# Patient Record
Sex: FEMALE | Race: WHITE | NOT HISPANIC OR LATINO | Employment: OTHER | URBAN - NONMETROPOLITAN AREA
[De-identification: names, ages, dates, MRNs, and addresses within clinical notes are randomized per-mention and may not be internally consistent; named-entity substitution may affect disease eponyms.]

---

## 2022-07-26 ENCOUNTER — OFFICE VISIT (OUTPATIENT)
Dept: FAMILY MEDICINE CLINIC | Facility: CLINIC | Age: 77
End: 2022-07-26

## 2022-07-26 VITALS
BODY MASS INDEX: 26.48 KG/M2 | DIASTOLIC BLOOD PRESSURE: 70 MMHG | WEIGHT: 158.9 LBS | OXYGEN SATURATION: 98 % | HEART RATE: 48 BPM | HEIGHT: 65 IN | SYSTOLIC BLOOD PRESSURE: 122 MMHG

## 2022-07-26 DIAGNOSIS — R73.9 HYPERGLYCEMIA: ICD-10-CM

## 2022-07-26 DIAGNOSIS — E55.9 VITAMIN D DEFICIENCY: ICD-10-CM

## 2022-07-26 DIAGNOSIS — I10 HYPERTENSION, ESSENTIAL: Primary | ICD-10-CM

## 2022-07-26 DIAGNOSIS — H40.9 GLAUCOMA OF BOTH EYES, UNSPECIFIED GLAUCOMA TYPE: ICD-10-CM

## 2022-07-26 DIAGNOSIS — E78.2 HYPERLIPIDEMIA, MIXED: ICD-10-CM

## 2022-07-26 DIAGNOSIS — E53.8 VITAMIN B12 DEFICIENCY: ICD-10-CM

## 2022-07-26 DIAGNOSIS — E03.9 HYPOTHYROIDISM (ACQUIRED): ICD-10-CM

## 2022-07-26 PROCEDURE — 99214 OFFICE O/P EST MOD 30 MIN: CPT | Performed by: FAMILY MEDICINE

## 2022-07-26 PROCEDURE — 36415 COLL VENOUS BLD VENIPUNCTURE: CPT | Performed by: FAMILY MEDICINE

## 2022-07-26 RX ORDER — MELATONIN
1000 DAILY
COMMUNITY
End: 2022-07-26 | Stop reason: SDUPTHER

## 2022-07-26 RX ORDER — FOLIC ACID 1 MG/1
1 TABLET ORAL DAILY
COMMUNITY
End: 2022-07-26 | Stop reason: SDUPTHER

## 2022-07-26 RX ORDER — ATORVASTATIN CALCIUM 10 MG/1
10 TABLET, FILM COATED ORAL DAILY
Qty: 90 TABLET | Refills: 1 | Status: SHIPPED | OUTPATIENT
Start: 2022-07-26 | End: 2023-01-26 | Stop reason: SDUPTHER

## 2022-07-26 RX ORDER — ATORVASTATIN CALCIUM 10 MG/1
10 TABLET, FILM COATED ORAL DAILY
COMMUNITY
End: 2022-07-26 | Stop reason: SDUPTHER

## 2022-07-26 RX ORDER — HYDROCHLOROTHIAZIDE 25 MG/1
25 TABLET ORAL DAILY
Qty: 90 TABLET | Refills: 1 | Status: SHIPPED | OUTPATIENT
Start: 2022-07-26 | End: 2023-01-26 | Stop reason: SDUPTHER

## 2022-07-26 RX ORDER — OLMESARTAN MEDOXOMIL 40 MG/1
40 TABLET ORAL DAILY
Qty: 90 TABLET | Refills: 1 | Status: SHIPPED | OUTPATIENT
Start: 2022-07-26 | End: 2023-01-26

## 2022-07-26 RX ORDER — OLMESARTAN MEDOXOMIL 40 MG/1
40 TABLET ORAL DAILY
COMMUNITY
End: 2022-07-26 | Stop reason: SDUPTHER

## 2022-07-26 RX ORDER — AMLODIPINE BESYLATE 5 MG/1
5 TABLET ORAL DAILY
Qty: 90 TABLET | Refills: 1 | Status: SHIPPED | OUTPATIENT
Start: 2022-07-26 | End: 2022-12-12 | Stop reason: SDUPTHER

## 2022-07-26 RX ORDER — HYDROCHLOROTHIAZIDE 25 MG/1
25 TABLET ORAL DAILY
COMMUNITY
End: 2022-07-26 | Stop reason: SDUPTHER

## 2022-07-26 RX ORDER — LANOLIN ALCOHOL/MO/W.PET/CERES
1000 CREAM (GRAM) TOPICAL EVERY OTHER DAY
Start: 2022-07-26 | End: 2023-01-26 | Stop reason: SDUPTHER

## 2022-07-26 RX ORDER — AMLODIPINE BESYLATE 2.5 MG/1
2.5 TABLET ORAL DAILY
COMMUNITY
End: 2022-07-26 | Stop reason: SDUPTHER

## 2022-07-26 RX ORDER — MELATONIN
2000 DAILY
Start: 2022-07-26 | End: 2023-01-26 | Stop reason: SDUPTHER

## 2022-07-26 RX ORDER — LATANOPROST 50 UG/ML
1 SOLUTION/ DROPS OPHTHALMIC NIGHTLY
COMMUNITY

## 2022-07-26 RX ORDER — AMLODIPINE BESYLATE 2.5 MG/1
2.5 TABLET ORAL DAILY
Qty: 90 TABLET | Refills: 1 | Status: SHIPPED | OUTPATIENT
Start: 2022-07-26 | End: 2023-01-19

## 2022-07-26 RX ORDER — LEVOTHYROXINE SODIUM 0.07 MG/1
75 TABLET ORAL DAILY
COMMUNITY
End: 2022-07-26 | Stop reason: SDUPTHER

## 2022-07-26 RX ORDER — LANOLIN ALCOHOL/MO/W.PET/CERES
1000 CREAM (GRAM) TOPICAL DAILY
COMMUNITY
End: 2022-07-26 | Stop reason: SDUPTHER

## 2022-07-26 RX ORDER — LEVOTHYROXINE SODIUM 0.07 MG/1
75 TABLET ORAL DAILY
Qty: 90 TABLET | Refills: 1 | Status: SHIPPED | OUTPATIENT
Start: 2022-07-26 | End: 2023-01-26

## 2022-07-26 RX ORDER — FOLIC ACID 1 MG/1
1 TABLET ORAL DAILY
Qty: 90 TABLET | Refills: 1 | Status: SHIPPED | OUTPATIENT
Start: 2022-07-26 | End: 2023-01-26 | Stop reason: SDUPTHER

## 2022-07-26 RX ORDER — AMLODIPINE BESYLATE 5 MG/1
5 TABLET ORAL DAILY
COMMUNITY
End: 2022-07-26 | Stop reason: SDUPTHER

## 2022-07-26 NOTE — PROGRESS NOTES
"Chief Complaint  Hypertension (Patient is fasting for labs.), Hyperlipidemia, and Hypothyroidism    Subjective    History of Present Illness:  Cecilia Crawley is a 77 y.o. female who presents today for follow-up visit and medication refills.    She continues to work hard on diet and exercise efforts given she was approaching diabetes at her last visit with Dr. Cullen.    Her last A1c with her physical in January was down to 5.8!.  Blood pressure remains well controlled on amlodipine, hydrochlorothiazide, and Benicar.    Continues with ophthalmology follow-up for glaucoma.    On B12 and folic acid along with vitamin D given past history of vitamin deficiencies.    Continues atorvastatin for hyperlipidemia.    On Synthroid for hypothyroidism with no missed doses.    Objective   Vital Signs:   /70 (BP Location: Left arm, Patient Position: Sitting, Cuff Size: Large Adult)   Pulse (!) 48   Ht 165.1 cm (65\")   Wt 72.1 kg (158 lb 14.4 oz)   SpO2 98%   BMI 26.44 kg/m²     Review of Systems   Constitutional: Negative for appetite change, chills and fever.   HENT: Negative for hearing loss.    Eyes: Negative for blurred vision.   Respiratory: Negative for chest tightness.    Cardiovascular: Negative for chest pain.   Gastrointestinal: Negative for abdominal pain.   Musculoskeletal: Negative for gait problem.   Skin: Negative for rash.   Psychiatric/Behavioral: Negative for depressed mood.       Past History:  Medical History: has no past medical history on file.   Surgical History: has no past surgical history on file.   Family History: family history is not on file.   Social History: reports that she quit smoking about 24 years ago. Her smoking use included cigarettes. She has a 20.00 pack-year smoking history. She has never used smokeless tobacco. She reports previous alcohol use. She reports that she does not use drugs.      Current Outpatient Medications:   •  amLODIPine (NORVASC) 2.5 MG tablet, Take 1 tablet " by mouth Daily., Disp: 90 tablet, Rfl: 1  •  amLODIPine (NORVASC) 5 MG tablet, Take 1 tablet by mouth Daily., Disp: 90 tablet, Rfl: 1  •  atorvastatin (LIPITOR) 10 MG tablet, Take 1 tablet by mouth Daily., Disp: 90 tablet, Rfl: 1  •  cholecalciferol (VITAMIN D3) 25 MCG (1000 UT) tablet, Take 2 tablets by mouth Daily., Disp: , Rfl:   •  folic acid (FOLVITE) 1 MG tablet, Take 1 tablet by mouth Daily., Disp: 90 tablet, Rfl: 1  •  hydroCHLOROthiazide (HYDRODIURIL) 25 MG tablet, Take 1 tablet by mouth Daily., Disp: 90 tablet, Rfl: 1  •  latanoprost (XALATAN) 0.005 % ophthalmic solution, 1 drop Every Night., Disp: , Rfl:   •  levothyroxine (SYNTHROID, LEVOTHROID) 75 MCG tablet, Take 1 tablet by mouth Daily., Disp: 90 tablet, Rfl: 1  •  olmesartan (BENICAR) 40 MG tablet, Take 1 tablet by mouth Daily., Disp: 90 tablet, Rfl: 1  •  vitamin B-12 (CYANOCOBALAMIN) 1000 MCG tablet, Take 1 tablet by mouth Every Other Day., Disp: , Rfl:     Allergies: Beta adrenergic blockers, Prazosin, and Sulfa antibiotics    Physical Exam  Constitutional:       Appearance: Normal appearance.   HENT:      Head: Normocephalic.      Right Ear: External ear normal.      Left Ear: External ear normal.      Nose: Nose normal.   Eyes:      Pupils: Pupils are equal, round, and reactive to light.   Cardiovascular:      Rate and Rhythm: Normal rate and regular rhythm.      Heart sounds: Normal heart sounds.   Pulmonary:      Effort: Pulmonary effort is normal.      Breath sounds: Normal breath sounds.   Musculoskeletal:         General: Normal range of motion.      Cervical back: Normal range of motion.   Skin:     General: Skin is warm and dry.   Neurological:      General: No focal deficit present.      Mental Status: She is alert.   Psychiatric:         Mood and Affect: Mood normal.         Behavior: Behavior normal.         Thought Content: Thought content normal.          Result Review                   Assessment and Plan  Diagnoses and all orders  for this visit:    1. Hypertension, essential (Primary)  Assessment & Plan:  Hypertension is improving with treatment.  Continue current treatment regimen.  Blood pressure will be reassessed at the next regular appointment.    Orders:  -     amLODIPine (NORVASC) 2.5 MG tablet; Take 1 tablet by mouth Daily.  Dispense: 90 tablet; Refill: 1  -     amLODIPine (NORVASC) 5 MG tablet; Take 1 tablet by mouth Daily.  Dispense: 90 tablet; Refill: 1  -     hydroCHLOROthiazide (HYDRODIURIL) 25 MG tablet; Take 1 tablet by mouth Daily.  Dispense: 90 tablet; Refill: 1  -     olmesartan (BENICAR) 40 MG tablet; Take 1 tablet by mouth Daily.  Dispense: 90 tablet; Refill: 1  -     CBC Auto Differential; Future  -     Comprehensive Metabolic Panel; Future  -     Lipid Panel; Future  -     TSH; Future  -     T4, Free; Future    2. Hyperlipidemia, mixed  Assessment & Plan:  Lipid abnormalities are improving with treatment.  Pharmacotherapy as ordered.  Lipids will be reassessed in 6 months.    Orders:  -     atorvastatin (LIPITOR) 10 MG tablet; Take 1 tablet by mouth Daily.  Dispense: 90 tablet; Refill: 1  -     CBC Auto Differential; Future  -     Comprehensive Metabolic Panel; Future  -     Lipid Panel; Future    3. Vitamin D deficiency  -     cholecalciferol (VITAMIN D3) 25 MCG (1000 UT) tablet; Take 2 tablets by mouth Daily.  -     Vitamin D 25 Hydroxy; Future    4. Glaucoma of both eyes, unspecified glaucoma type  Assessment & Plan:  Continue ophthalmology management follow-up      5. Hypothyroidism (acquired)  -     levothyroxine (SYNTHROID, LEVOTHROID) 75 MCG tablet; Take 1 tablet by mouth Daily.  Dispense: 90 tablet; Refill: 1  -     TSH; Future  -     T4, Free; Future    6. Vitamin B12 deficiency  -     folic acid (FOLVITE) 1 MG tablet; Take 1 tablet by mouth Daily.  Dispense: 90 tablet; Refill: 1  -     vitamin B-12 (CYANOCOBALAMIN) 1000 MCG tablet; Take 1 tablet by mouth Every Other Day.  -     Vitamin B12; Future  -      Folate; Future    7. Hyperglycemia  Assessment & Plan:  Continue diet and exercise efforts    Orders:  -     Hemoglobin A1c; Future      BMI is >= 25 and <30. (Overweight) The following options were offered after discussion;: exercise counseling/recommendations and nutrition counseling/recommendations          Follow Up  Return in about 6 months (around 1/26/2023) for Annual physical, Medicare Wellness, Med recheck.    Kalyan Rocha MD

## 2022-07-27 ENCOUNTER — TELEPHONE (OUTPATIENT)
Dept: FAMILY MEDICINE CLINIC | Facility: CLINIC | Age: 77
End: 2022-07-27

## 2022-07-27 LAB
25(OH)D3+25(OH)D2 SERPL-MCNC: 43.7 NG/ML (ref 30–100)
ALBUMIN SERPL-MCNC: 4.5 G/DL (ref 3.7–4.7)
ALBUMIN/GLOB SERPL: 1.8 {RATIO} (ref 1.2–2.2)
ALP SERPL-CCNC: 63 IU/L (ref 44–121)
ALT SERPL-CCNC: 13 IU/L (ref 0–32)
AST SERPL-CCNC: 23 IU/L (ref 0–40)
BASOPHILS # BLD AUTO: 0.1 X10E3/UL (ref 0–0.2)
BASOPHILS NFR BLD AUTO: 1 %
BILIRUB SERPL-MCNC: 0.3 MG/DL (ref 0–1.2)
BUN SERPL-MCNC: 33 MG/DL (ref 8–27)
BUN/CREAT SERPL: 20 (ref 12–28)
CALCIUM SERPL-MCNC: 10 MG/DL (ref 8.7–10.3)
CHLORIDE SERPL-SCNC: 106 MMOL/L (ref 96–106)
CHOLEST SERPL-MCNC: 188 MG/DL (ref 100–199)
CO2 SERPL-SCNC: 23 MMOL/L (ref 20–29)
CREAT SERPL-MCNC: 1.62 MG/DL (ref 0.57–1)
EGFRCR SERPLBLD CKD-EPI 2021: 32 ML/MIN/1.73
EOSINOPHIL # BLD AUTO: 0.2 X10E3/UL (ref 0–0.4)
EOSINOPHIL NFR BLD AUTO: 2 %
ERYTHROCYTE [DISTWIDTH] IN BLOOD BY AUTOMATED COUNT: 13.1 % (ref 11.7–15.4)
FOLATE SERPL-MCNC: >20 NG/ML
GLOBULIN SER CALC-MCNC: 2.5 G/DL (ref 1.5–4.5)
GLUCOSE SERPL-MCNC: 91 MG/DL (ref 65–99)
HBA1C MFR BLD: 5.7 % (ref 4.8–5.6)
HCT VFR BLD AUTO: 35.8 % (ref 34–46.6)
HDLC SERPL-MCNC: 54 MG/DL
HGB BLD-MCNC: 12 G/DL (ref 11.1–15.9)
IMM GRANULOCYTES # BLD AUTO: 0.1 X10E3/UL (ref 0–0.1)
IMM GRANULOCYTES NFR BLD AUTO: 1 %
LDLC SERPL CALC-MCNC: 106 MG/DL (ref 0–99)
LYMPHOCYTES # BLD AUTO: 2.1 X10E3/UL (ref 0.7–3.1)
LYMPHOCYTES NFR BLD AUTO: 28 %
MCH RBC QN AUTO: 31.7 PG (ref 26.6–33)
MCHC RBC AUTO-ENTMCNC: 33.5 G/DL (ref 31.5–35.7)
MCV RBC AUTO: 95 FL (ref 79–97)
MONOCYTES # BLD AUTO: 0.8 X10E3/UL (ref 0.1–0.9)
MONOCYTES NFR BLD AUTO: 10 %
NEUTROPHILS # BLD AUTO: 4.2 X10E3/UL (ref 1.4–7)
NEUTROPHILS NFR BLD AUTO: 58 %
PLATELET # BLD AUTO: 180 X10E3/UL (ref 150–450)
POTASSIUM SERPL-SCNC: 4.5 MMOL/L (ref 3.5–5.2)
PROT SERPL-MCNC: 7 G/DL (ref 6–8.5)
RBC # BLD AUTO: 3.78 X10E6/UL (ref 3.77–5.28)
SODIUM SERPL-SCNC: 144 MMOL/L (ref 134–144)
T4 FREE SERPL-MCNC: 1.47 NG/DL (ref 0.82–1.77)
TRIGL SERPL-MCNC: 164 MG/DL (ref 0–149)
TSH SERPL DL<=0.005 MIU/L-ACNC: 0.88 UIU/ML (ref 0.45–4.5)
VIT B12 SERPL-MCNC: 616 PG/ML (ref 232–1245)
VLDLC SERPL CALC-MCNC: 28 MG/DL (ref 5–40)
WBC # BLD AUTO: 7.4 X10E3/UL (ref 3.4–10.8)

## 2022-07-27 NOTE — TELEPHONE ENCOUNTER
Caller: Cecilia Crawley     Relationship: SELF    Best call back number: [phone]    What is your medical concern? [facility] SHE DID NOT SEE HER A1C RESULTS AND WANTED TO KNOW IF DR FLORES ORDER THIS FROM HER LAST APPOINTMENT YESTERDAY (7/26/22)    Is your provider already aware of this issue? NO

## 2022-12-12 DIAGNOSIS — I10 HYPERTENSION, ESSENTIAL: ICD-10-CM

## 2022-12-12 RX ORDER — AMLODIPINE BESYLATE 5 MG/1
5 TABLET ORAL DAILY
Qty: 90 TABLET | Refills: 1 | Status: SHIPPED | OUTPATIENT
Start: 2022-12-12 | End: 2023-01-26 | Stop reason: SDUPTHER

## 2022-12-12 NOTE — TELEPHONE ENCOUNTER
Caller: Cecilia Crawley    Relationship: Self    Best call back number: [phone] TO LEAVE A DETAILED MESSAGE IF NO ANSWER    Requested Prescriptions:   Requested Prescriptions     Pending Prescriptions Disp Refills   • amLODIPine (NORVASC) 5 MG tablet 90 tablet 1     Sig: Take 1 tablet by mouth Daily.        Pharmacy where request should be sent: [facility] [address] [place] [address] [phone] [facility] [phone]      Additional details provided by patient: PLEASE SEND 90 DAY SUPPLY    Does the patient have less than a 3 day supply:  [x] Yes  [] No    Would you like a call back once the refill request has been completed: [] Yes [x] No    If the office needs to give you a call back, can they leave a voicemail: [x] Yes [] No    Fede Nguyen Rep   12/12/22 10:12 EST

## 2023-01-17 DIAGNOSIS — I10 HYPERTENSION, ESSENTIAL: ICD-10-CM

## 2023-01-19 RX ORDER — AMLODIPINE BESYLATE 2.5 MG/1
TABLET ORAL
Qty: 90 TABLET | Refills: 0 | Status: SHIPPED | OUTPATIENT
Start: 2023-01-19 | End: 2023-01-26

## 2023-01-23 ENCOUNTER — TELEPHONE (OUTPATIENT)
Dept: FAMILY MEDICINE CLINIC | Facility: CLINIC | Age: 78
End: 2023-01-23
Payer: MEDICARE

## 2023-01-23 DIAGNOSIS — R73.9 HYPERGLYCEMIA: ICD-10-CM

## 2023-01-23 DIAGNOSIS — E03.9 HYPOTHYROIDISM (ACQUIRED): ICD-10-CM

## 2023-01-23 DIAGNOSIS — E78.2 HYPERLIPIDEMIA, MIXED: Primary | ICD-10-CM

## 2023-01-23 NOTE — TELEPHONE ENCOUNTER
Caller: Cecilia Crawley    Relationship: Self    Best call back number: [phone]    What orders are you requesting (i.e. lab or imaging): LABS    In what timeframe would the patient need to come in: BEFORE 1.26.23 APPOINTMENT     Where will you receive your lab/imaging services: OFFICE     Additional notes: WANTS TO GET LABS DONE BEFORE HER WELLNESS VISIT

## 2023-01-24 ENCOUNTER — LAB (OUTPATIENT)
Dept: FAMILY MEDICINE CLINIC | Facility: CLINIC | Age: 78
End: 2023-01-24
Payer: MEDICARE

## 2023-01-24 DIAGNOSIS — E78.2 HYPERLIPIDEMIA, MIXED: ICD-10-CM

## 2023-01-24 DIAGNOSIS — R73.9 HYPERGLYCEMIA: ICD-10-CM

## 2023-01-24 DIAGNOSIS — E03.9 HYPOTHYROIDISM (ACQUIRED): ICD-10-CM

## 2023-01-24 PROCEDURE — 36415 COLL VENOUS BLD VENIPUNCTURE: CPT | Performed by: FAMILY MEDICINE

## 2023-01-25 LAB
ALBUMIN SERPL-MCNC: 4.6 G/DL (ref 3.7–4.7)
ALBUMIN/GLOB SERPL: 2 {RATIO} (ref 1.2–2.2)
ALP SERPL-CCNC: 67 IU/L (ref 44–121)
ALT SERPL-CCNC: 13 IU/L (ref 0–32)
AST SERPL-CCNC: 21 IU/L (ref 0–40)
BILIRUB SERPL-MCNC: 0.2 MG/DL (ref 0–1.2)
BUN SERPL-MCNC: 41 MG/DL (ref 8–27)
BUN/CREAT SERPL: 29 (ref 12–28)
CALCIUM SERPL-MCNC: 9.8 MG/DL (ref 8.7–10.3)
CHLORIDE SERPL-SCNC: 105 MMOL/L (ref 96–106)
CHOLEST SERPL-MCNC: 149 MG/DL (ref 100–199)
CO2 SERPL-SCNC: 23 MMOL/L (ref 20–29)
CREAT SERPL-MCNC: 1.39 MG/DL (ref 0.57–1)
EGFRCR SERPLBLD CKD-EPI 2021: 39 ML/MIN/1.73
GLOBULIN SER CALC-MCNC: 2.3 G/DL (ref 1.5–4.5)
GLUCOSE SERPL-MCNC: 92 MG/DL (ref 70–99)
HBA1C MFR BLD: 5.8 % (ref 4.8–5.6)
HDLC SERPL-MCNC: 45 MG/DL
LDLC SERPL CALC-MCNC: 76 MG/DL (ref 0–99)
POTASSIUM SERPL-SCNC: 4.5 MMOL/L (ref 3.5–5.2)
PROT SERPL-MCNC: 6.9 G/DL (ref 6–8.5)
SODIUM SERPL-SCNC: 143 MMOL/L (ref 134–144)
T4 FREE SERPL-MCNC: 1.33 NG/DL (ref 0.82–1.77)
TRIGL SERPL-MCNC: 162 MG/DL (ref 0–149)
TSH SERPL DL<=0.005 MIU/L-ACNC: 1.97 UIU/ML (ref 0.45–4.5)
VLDLC SERPL CALC-MCNC: 28 MG/DL (ref 5–40)

## 2023-01-26 ENCOUNTER — OFFICE VISIT (OUTPATIENT)
Dept: FAMILY MEDICINE CLINIC | Facility: CLINIC | Age: 78
End: 2023-01-26
Payer: MEDICARE

## 2023-01-26 VITALS
SYSTOLIC BLOOD PRESSURE: 110 MMHG | BODY MASS INDEX: 26.66 KG/M2 | HEIGHT: 65 IN | DIASTOLIC BLOOD PRESSURE: 62 MMHG | HEART RATE: 77 BPM | OXYGEN SATURATION: 98 % | WEIGHT: 160 LBS

## 2023-01-26 DIAGNOSIS — E53.8 FOLIC ACID DEFICIENCY: ICD-10-CM

## 2023-01-26 DIAGNOSIS — Z11.59 NEED FOR HEPATITIS C SCREENING TEST: ICD-10-CM

## 2023-01-26 DIAGNOSIS — M25.511 ACUTE PAIN OF RIGHT SHOULDER: ICD-10-CM

## 2023-01-26 DIAGNOSIS — H40.9 GLAUCOMA OF BOTH EYES, UNSPECIFIED GLAUCOMA TYPE: Chronic | ICD-10-CM

## 2023-01-26 DIAGNOSIS — Z09 ENCOUNTER FOR FOLLOW-UP EXAMINATION AFTER COMPLETED TREATMENT FOR CONDITIONS OTHER THAN MALIGNANT NEOPLASM: ICD-10-CM

## 2023-01-26 DIAGNOSIS — E78.2 HYPERLIPIDEMIA, MIXED: Chronic | ICD-10-CM

## 2023-01-26 DIAGNOSIS — N18.32 STAGE 3B CHRONIC KIDNEY DISEASE: ICD-10-CM

## 2023-01-26 DIAGNOSIS — E55.9 VITAMIN D DEFICIENCY: Chronic | ICD-10-CM

## 2023-01-26 DIAGNOSIS — R73.9 HYPERGLYCEMIA: Chronic | ICD-10-CM

## 2023-01-26 DIAGNOSIS — Z23 NEED FOR PROPHYLACTIC VACCINATION AGAINST STREPTOCOCCUS PNEUMONIAE (PNEUMOCOCCUS): ICD-10-CM

## 2023-01-26 DIAGNOSIS — M75.21 BICEPS TENDINITIS OF RIGHT UPPER EXTREMITY: ICD-10-CM

## 2023-01-26 DIAGNOSIS — R80.9 MICROALBUMINURIA: ICD-10-CM

## 2023-01-26 DIAGNOSIS — E03.9 HYPOTHYROIDISM (ACQUIRED): Chronic | ICD-10-CM

## 2023-01-26 DIAGNOSIS — Z13.820 SCREENING FOR OSTEOPOROSIS: ICD-10-CM

## 2023-01-26 DIAGNOSIS — E53.8 VITAMIN B12 DEFICIENCY: Chronic | ICD-10-CM

## 2023-01-26 DIAGNOSIS — I10 HYPERTENSION, ESSENTIAL: Chronic | ICD-10-CM

## 2023-01-26 DIAGNOSIS — Z00.00 GENERAL MEDICAL EXAM: Primary | ICD-10-CM

## 2023-01-26 LAB — POC MICROALBUMIN URINE: 20

## 2023-01-26 PROCEDURE — G0009 ADMIN PNEUMOCOCCAL VACCINE: HCPCS | Performed by: FAMILY MEDICINE

## 2023-01-26 PROCEDURE — G0439 PPPS, SUBSEQ VISIT: HCPCS | Performed by: FAMILY MEDICINE

## 2023-01-26 PROCEDURE — 99397 PER PM REEVAL EST PAT 65+ YR: CPT | Performed by: FAMILY MEDICINE

## 2023-01-26 PROCEDURE — 99213 OFFICE O/P EST LOW 20 MIN: CPT | Performed by: FAMILY MEDICINE

## 2023-01-26 PROCEDURE — 1170F FXNL STATUS ASSESSED: CPT | Performed by: FAMILY MEDICINE

## 2023-01-26 PROCEDURE — 1159F MED LIST DOCD IN RCRD: CPT | Performed by: FAMILY MEDICINE

## 2023-01-26 PROCEDURE — 82044 UR ALBUMIN SEMIQUANTITATIVE: CPT | Performed by: FAMILY MEDICINE

## 2023-01-26 PROCEDURE — 90677 PCV20 VACCINE IM: CPT | Performed by: FAMILY MEDICINE

## 2023-01-26 PROCEDURE — 96160 PT-FOCUSED HLTH RISK ASSMT: CPT | Performed by: FAMILY MEDICINE

## 2023-01-26 RX ORDER — LANOLIN ALCOHOL/MO/W.PET/CERES
1000 CREAM (GRAM) TOPICAL EVERY OTHER DAY
Start: 2023-01-26

## 2023-01-26 RX ORDER — HYDROCHLOROTHIAZIDE 25 MG/1
25 TABLET ORAL DAILY
Qty: 90 TABLET | Refills: 1 | Status: SHIPPED | OUTPATIENT
Start: 2023-01-26

## 2023-01-26 RX ORDER — OLMESARTAN MEDOXOMIL 40 MG/1
TABLET ORAL
COMMUNITY
Start: 2022-12-12 | End: 2023-01-26

## 2023-01-26 RX ORDER — MELATONIN
2000 DAILY
Start: 2023-01-26

## 2023-01-26 RX ORDER — AMLODIPINE BESYLATE 5 MG/1
5 TABLET ORAL DAILY
Qty: 90 TABLET | Refills: 1 | Status: SHIPPED | OUTPATIENT
Start: 2023-01-26

## 2023-01-26 RX ORDER — FOLIC ACID 1 MG/1
1 TABLET ORAL DAILY
Qty: 90 TABLET | Refills: 1 | Status: SHIPPED | OUTPATIENT
Start: 2023-01-26

## 2023-01-26 RX ORDER — AMLODIPINE BESYLATE 2.5 MG/1
TABLET ORAL
COMMUNITY
Start: 2022-10-23 | End: 2023-01-26 | Stop reason: SDUPTHER

## 2023-01-26 RX ORDER — AMLODIPINE BESYLATE 2.5 MG/1
2.5 TABLET ORAL DAILY
Qty: 90 TABLET | Refills: 1 | Status: SHIPPED | OUTPATIENT
Start: 2023-01-26

## 2023-01-26 RX ORDER — LEVOTHYROXINE SODIUM 0.07 MG/1
75 TABLET ORAL DAILY
Qty: 90 TABLET | Refills: 1 | Status: SHIPPED | OUTPATIENT
Start: 2023-01-26

## 2023-01-26 RX ORDER — OLMESARTAN MEDOXOMIL 40 MG/1
40 TABLET ORAL DAILY
Qty: 90 TABLET | Refills: 1 | Status: SHIPPED | OUTPATIENT
Start: 2023-01-26

## 2023-01-26 RX ORDER — ATORVASTATIN CALCIUM 10 MG/1
10 TABLET, FILM COATED ORAL DAILY
Qty: 90 TABLET | Refills: 1 | Status: SHIPPED | OUTPATIENT
Start: 2023-01-26

## 2023-01-26 RX ORDER — LEVOTHYROXINE SODIUM 0.07 MG/1
TABLET ORAL
COMMUNITY
Start: 2022-11-12 | End: 2023-01-26

## 2023-01-26 NOTE — ASSESSMENT & PLAN NOTE
Reviewed together health maintenance and screening along with vaccination options at her Medicare annual wellness visit and physical exam today.    New problems addressed today included ongoing right anterior shoulder pain and biceps tendinitis.  Further work-up and treatment options reviewed together regarding her right shoulder pain and biceps tendinitis.  Given home exercises to start with recommendation for Tylenol.  If not improving she will call to get an order for physical therapy.    Encouraged her to get us a copy of her advanced directive.    Scheduling past-due DEXA.    Updated vaccinations with Prevnar 20.    Discussed hep C screening and she will get this done with her next blood work.    Discussed positive microalbuminuria but she is already on olmesartan for hypertension.  Chronic kidney disease does remain in stage III but has improved compared to her last lab work.    She does understand the need to avoid NSAIDs given chronic kidney disease.

## 2023-01-26 NOTE — ASSESSMENT & PLAN NOTE
History and exam suggestive for biceps tendinitis with possible adhesive capsulitis.    Further work-up discussed.  We will start with home exercises.  Printed and reviewed home exercises with her today.  She may use Tylenol for pain.    If not improving she will call for an order to get started with physical therapy.    This was a new issue address today.  She had plan to make an appointment for this ongoing problem even if she did not have her annual wellness visit or physical exam today.

## 2023-01-26 NOTE — PROGRESS NOTES
QUICK REFERENCE INFORMATION:  The ABCs of the Annual Wellness Visit    Subsequent Medicare Wellness Visit    @[email or web address]@    HEALTH RISK ASSESSMENT    1945    Recent Hospitalizations:  No hospitalization(s) within the last year..        Current Medical Providers:  Patient Care Team:  Kalyan Rocha MD as PCP - General (Family Medicine)        Smoking Status:  Social History     Tobacco Use   Smoking Status Former   • Packs/day: 0.50   • Years: 40.00   • Pack years: 20.00   • Types: Cigarettes   • Quit date:    • Years since quittin.0   Smokeless Tobacco Never       Alcohol Consumption:  Social History     Substance and Sexual Activity   Alcohol Use Not Currently       Depression Screen:   PHQ-2/PHQ-9 Depression Screening 2023   Little Interest or Pleasure in Doing Things 0-->not at all   Feeling Down, Depressed or Hopeless 0-->not at all   PHQ-9: Brief Depression Severity Measure Score 0       Health Habits and Functional and Cognitive Screening:  Functional & Cognitive Status 2023   Do you have difficulty preparing food and eating? No   Do you have difficulty bathing yourself, getting dressed or grooming yourself? No   Do you have difficulty using the toilet? No   Do you have difficulty moving around from place to place? No   Do you have trouble with steps or getting out of a bed or a chair? No   Current Diet Well Balanced Diet   Dental Exam Not up to date   Eye Exam Up to date   Exercise (times per week) 3 times per week   Current Exercises Include Walking   Do you need help using the phone?  No   Are you deaf or do you have serious difficulty hearing?  No   Do you need help with transportation? No   Do you need help shopping? No   Do you need help preparing meals?  No   Do you need help with housework?  No   Do you need help with laundry? No   Do you need help taking your medications? No   Do you need help managing money? No   Do you ever drive or ride in a car without wearing a seat  belt? No   Have you felt unusual stress, anger or loneliness in the last month? No   Who do you live with? Spouse   If you need help, do you have trouble finding someone available to you? No   Have you been bothered in the last four weeks by sexual problems? No   Do you have difficulty concentrating, remembering or making decisions? No       Fall Risk Screen:  JULIAN Fall Risk Assessment was completed, and patient is at LOW risk for falls.Assessment completed on:1/26/2023    ACE III MINI        Does the patient have evidence of cognitive impairment? No    Aspirin use counseling: Does not need ASA (and currently is not on it)    Recent Lab Results:  CMP:  Lab Results   Component Value Date    BUN 41 (H) 01/24/2023    CREATININE 1.39 (H) 01/24/2023    BCR 29 (H) 01/24/2023     01/24/2023    K 4.5 01/24/2023    CO2 23 01/24/2023    CALCIUM 9.8 01/24/2023    PROTENTOTREF 6.9 01/24/2023    ALBUMIN 4.6 01/24/2023    LABGLOBREF 2.3 01/24/2023    LABIL2 2.0 01/24/2023    BILITOT 0.2 01/24/2023    ALKPHOS 67 01/24/2023    AST 21 01/24/2023    ALT 13 01/24/2023     HbA1c:  Lab Results   Component Value Date    HGBA1C 5.8 (H) 01/24/2023    HGBA1C 5.7 (H) 07/26/2022     Microalbumin:  Lab Results   Component Value Date    POCMALB 20 01/26/2023     Lipid Panel  Lab Results   Component Value Date    TRIG 162 (H) 01/24/2023    HDL 45 01/24/2023    LDL 76 01/24/2023    AST 21 01/24/2023    ALT 13 01/24/2023       Visual Acuity:  No results found.    Age-appropriate Screening Schedule:  Refer to the list below for future screening recommendations based on patient's age, sex and/or medical conditions. Orders for these recommended tests are listed in the plan section. The patient has been provided with a written plan.    Health Maintenance   Topic Date Due   • DXA SCAN  Never done   • HEMOGLOBIN A1C  07/24/2023   • DIABETIC EYE EXAM  11/29/2023   • LIPID PANEL  01/24/2024   • URINE MICROALBUMIN  01/26/2024   • INFLUENZA VACCINE   Completed   • TDAP/TD VACCINES  Discontinued   • ZOSTER VACCINE  Discontinued        Subjective   History of Present Illness    Cecilia Crawley is a 77 y.o. female who presents for a Subsequent Wellness Visit and physical exam.    Doing well since our last visit together in July.    She has been experiencing some right shoulder and biceps pain after hanging [place] decorations this year.  She is using Tylenol for pain but has ongoing range of motion problems.  Interested in trying home exercises before physical therapy.    She did have blood work done before today's appointment and it showed ongoing stage IIIb chronic kidney disease related to hypertension and diabetes.  This has improved compared to her last lab work.  She does understand the need to avoid NSAIDs given chronic kidney disease.  She did have a microalbumin test today that returned positive (20).  She does continue on olmesartan for hypertension which also helps with renal protection.    She continues to work hard on diet and exercise efforts given concerns for prediabetes.     Her last A1c was down to 5.8!.  Blood pressure remains well controlled on amlodipine, hydrochlorothiazide, and Benicar.     Continues with ophthalmology follow-up for glaucoma.     On B12 and folic acid along with vitamin D given past history of vitamin deficiencies.     Continues atorvastatin for hyperlipidemia.     On Synthroid for hypothyroidism with no missed doses.    She reports she does have an advance directive and will get us a copy for chart update.    Discussed past-due DEXA and she is willing to schedule.    Mammogram up-to-date at [facility].    Willing for Prevnar 20 today.  Wants to wait on Shingrix and Tdap  CHRONIC CONDITIONS    The following portions of the patient's history were reviewed and updated as appropriate: allergies, current medications, past family history, past medical history, past social history, past surgical history and  problem list.    Outpatient Medications Prior to Visit   Medication Sig Dispense Refill   • latanoprost (XALATAN) 0.005 % ophthalmic solution 1 drop Every Night.     • amLODIPine (NORVASC) 5 MG tablet Take 1 tablet by mouth Daily. 90 tablet 1   • atorvastatin (LIPITOR) 10 MG tablet Take 1 tablet by mouth Daily. 90 tablet 1   • cholecalciferol (VITAMIN D3) 25 MCG (1000 UT) tablet Take 2 tablets by mouth Daily.     • folic acid (FOLVITE) 1 MG tablet Take 1 tablet by mouth Daily. 90 tablet 1   • hydroCHLOROthiazide (HYDRODIURIL) 25 MG tablet Take 1 tablet by mouth Daily. 90 tablet 1   • levothyroxine (SYNTHROID, LEVOTHROID) 75 MCG tablet Take 1 tablet by mouth Daily. 90 tablet 1   • levothyroxine (SYNTHROID, LEVOTHROID) 75 MCG tablet      • olmesartan (BENICAR) 40 MG tablet Take 1 tablet by mouth Daily. 90 tablet 1   • olmesartan (BENICAR) 40 MG tablet      • vitamin B-12 (CYANOCOBALAMIN) 1000 MCG tablet Take 1 tablet by mouth Every Other Day.     • amLODIPine (NORVASC) 2.5 MG tablet Take 1 tablet by mouth once daily 90 tablet 0   • amLODIPine (NORVASC) 2.5 MG tablet        No facility-administered medications prior to visit.       Patient Active Problem List   Diagnosis   • Hypertension, essential   • Hyperlipidemia, mixed   • Vitamin D deficiency   • Glaucoma of both eyes   • Hypothyroidism (acquired)   • Vitamin B12 deficiency   • Hyperglycemia   • Microalbuminuria   • General medical exam   • Screening for osteoporosis   • Need for prophylactic vaccination against Streptococcus pneumoniae (pneumococcus)   • Need for hepatitis C screening test   • Stage 3b chronic kidney disease (HCC)   • Acute pain of right shoulder   • Biceps tendinitis of right upper extremity       Advance Care Planning:  ACP discussion was held with the patient during this visit. Patient does not have an advance directive, information provided.    Identification of Risk Factors:  Risk factors include: Advance Directive Discussion  Breast  Cancer/Mammogram Screening  Fall Risk  Immunizations Discussed/Encouraged (specific immunizations; Tdap, Prevnar 20 (Pneumococcal 20-valent conjugate) and Shingrix )  Osteoporosis Risk.    Review of Systems   Constitutional: Negative for appetite change, chills, fever and unexpected weight change.   HENT: Negative for hearing loss.    Eyes: Negative for visual disturbance.   Respiratory: Negative for chest tightness, shortness of breath and wheezing.    Cardiovascular: Negative for chest pain, palpitations and leg swelling.   Gastrointestinal: Negative for abdominal pain.   Musculoskeletal: Positive for arthralgias. Negative for back pain and gait problem.        Right shoulder pain.  See HPI   Skin: Negative for rash.   Neurological: Negative for dizziness and headaches.   Psychiatric/Behavioral: Negative for agitation and confusion. The patient is not nervous/anxious.        Compared to one year ago, the patient feels her physical health is better.  Compared to one year ago, the patient feels her mental health is better.    Objective     Physical Exam  Constitutional:       Appearance: Normal appearance.   HENT:      Head: Normocephalic.      Right Ear: External ear normal.      Left Ear: External ear normal.      Nose: Nose normal.   Eyes:      Pupils: Pupils are equal, round, and reactive to light.   Cardiovascular:      Rate and Rhythm: Normal rate and regular rhythm.      Heart sounds: Normal heart sounds.   Pulmonary:      Effort: Pulmonary effort is normal.      Breath sounds: Normal breath sounds.   Musculoskeletal:      Cervical back: Normal range of motion.      Comments: Positive Neer, negative Carter.  Localized tenderness along the upper biceps consistent with biceps tendinitis.  Normal  strength   Skin:     General: Skin is warm and dry.   Neurological:      General: No focal deficit present.      Mental Status: She is alert.   Psychiatric:         Mood and Affect: Mood normal.         Behavior:  "Behavior normal.         Thought Content: Thought content normal.          Procedures     Vitals:    01/26/23 0814   BP: 110/62   BP Location: Left arm   Patient Position: Sitting   Cuff Size: Adult   Pulse: 77   SpO2: 98%   Weight: 72.6 kg (160 lb)   Height: 165.1 cm (65\")       BMI is >= 25 and <30. (Overweight) The following options were offered after discussion;: exercise counseling/recommendations and nutrition counseling/recommendations      Lab Results   Component Value Date    WBC 7.4 07/26/2022    HGB 12.0 07/26/2022    HCT 35.8 07/26/2022    MCV 95 07/26/2022     07/26/2022     Lab Results   Component Value Date    GLUCOSE 92 01/24/2023    BUN 41 (H) 01/24/2023    CREATININE 1.39 (H) 01/24/2023    BCR 29 (H) 01/24/2023    K 4.5 01/24/2023    CO2 23 01/24/2023    CALCIUM 9.8 01/24/2023    PROTENTOTREF 6.9 01/24/2023    ALBUMIN 4.6 01/24/2023    LABIL2 2.0 01/24/2023    AST 21 01/24/2023    ALT 13 01/24/2023     Lab Results   Component Value Date    TSH 1.970 01/24/2023     No results found for: PSA  Lab Results   Component Value Date    CHLPL 149 01/24/2023    TRIG 162 (H) 01/24/2023    HDL 45 01/24/2023    LDL 76 01/24/2023       Assessment & Plan   Problem List Items Addressed This Visit        Cardiac and Vasculature    Hypertension, essential (Chronic)    Current Assessment & Plan     Hypertension is improving with treatment.  Continue current treatment regimen.  Blood pressure will be reassessed at the next regular appointment.         Relevant Medications    amLODIPine (NORVASC) 2.5 MG tablet    amLODIPine (NORVASC) 5 MG tablet    hydroCHLOROthiazide (HYDRODIURIL) 25 MG tablet    olmesartan (BENICAR) 40 MG tablet    Other Relevant Orders    Comprehensive Metabolic Panel    Lipid Panel    TSH    T4, Free    Hyperlipidemia, mixed (Chronic)    Current Assessment & Plan     Lipid abnormalities are improving with treatment.  Pharmacotherapy as ordered.  Lipids will be reassessed in 6 months.         " Relevant Medications    atorvastatin (LIPITOR) 10 MG tablet    Other Relevant Orders    Comprehensive Metabolic Panel    Lipid Panel    TSH    T4, Free       Endocrine and Metabolic    Vitamin D deficiency (Chronic)    Current Assessment & Plan     Continue vitamin D         Relevant Medications    cholecalciferol (VITAMIN D3) 25 MCG (1000 UT) tablet    Other Relevant Orders    Vitamin D,25-Hydroxy    Hypothyroidism (acquired) (Chronic)    Current Assessment & Plan     Continue Synthroid         Relevant Medications    levothyroxine (SYNTHROID, LEVOTHROID) 75 MCG tablet    Other Relevant Orders    Comprehensive Metabolic Panel    Lipid Panel    TSH    T4, Free    Vitamin B12 deficiency (Chronic)    Current Assessment & Plan     Continues on B12 with folic acid         Relevant Medications    folic acid (FOLVITE) 1 MG tablet    vitamin B-12 (CYANOCOBALAMIN) 1000 MCG tablet    Other Relevant Orders    CBC Auto Differential    Vitamin B12    Hyperglycemia (Chronic)    Current Assessment & Plan     Continue to work on diet and exercise efforts.  Recent A1c was 5.8.         Relevant Orders    POCT microalbumin (Completed)    Hemoglobin A1c       Eye    Glaucoma of both eyes (Chronic)    Current Assessment & Plan     Continue ophthalmology follow-up         Relevant Medications    latanoprost (XALATAN) 0.005 % ophthalmic solution       Genitourinary and Reproductive     Microalbuminuria (Chronic)    Current Assessment & Plan     Continue on olmesartan         Stage 3b chronic kidney disease (HCC) (Chronic)    Current Assessment & Plan     Kidney function improving.    Discussed no NSAIDs over-the-counter.    Continue with management of hypertension, hyperlipidemia, and prediabetes.    Continue on losartan for hypertension and renal protection given positive microalbuminuria today.         Relevant Medications    hydroCHLOROthiazide (HYDRODIURIL) 25 MG tablet       Health Encounters    General medical exam - Primary     Current Assessment & Plan     Reviewed together health maintenance and screening along with vaccination options at her Medicare annual wellness visit and physical exam today.    New problems addressed today included ongoing right anterior shoulder pain and biceps tendinitis.  Further work-up and treatment options reviewed together regarding her right shoulder pain and biceps tendinitis.  Given home exercises to start with recommendation for Tylenol.  If not improving she will call to get an order for physical therapy.    Encouraged her to get us a copy of her advanced directive.    Scheduling past-due DEXA.    Updated vaccinations with Prevnar 20.    Discussed hep C screening and she will get this done with her next blood work.    Discussed positive microalbuminuria but she is already on olmesartan for hypertension.  Chronic kidney disease does remain in stage III but has improved compared to her last lab work.    She does understand the need to avoid NSAIDs given chronic kidney disease.                Infectious Diseases    Need for prophylactic vaccination against Streptococcus pneumoniae (pneumococcus)    Relevant Orders    Pneumococcal Conjugate Vaccine 20-Valent (PCV20) (Completed)    Need for hepatitis C screening test    Relevant Orders    HCV Antibody Rfx To Qnt PCR       Musculoskeletal and Injuries    Screening for osteoporosis    Relevant Orders    DEXA Bone Density Axial    Acute pain of right shoulder    Current Assessment & Plan     History and exam suggestive for biceps tendinitis with possible adhesive capsulitis.    Further work-up discussed.  We will start with home exercises.  Printed and reviewed home exercises with her today.  She may use Tylenol for pain.    If not improving she will call for an order to get started with physical therapy.    This was a new issue address today.  She had plan to make an appointment for this ongoing problem even if she did not have her annual wellness visit or  physical exam today.         Biceps tendinitis of right upper extremity    Current Assessment & Plan     See above        Other Visit Diagnoses     Encounter for follow-up examination after completed treatment for conditions other than malignant neoplasm        Relevant Orders    DEXA Bone Density Axial    Folic acid deficiency        Relevant Orders    CBC Auto Differential    Folate        Patient Self-Management and Personalized Health Advice  The patient has been provided with information about: exercise and weight management and preventive services including:   · Annual Wellness Visit (AWV)  · Bone Density Measurements  · Hepatitis C Virus Screening (beneficiaries must fall into one of the following categories to be eligible- high risk for HCV infection, born between [birth year]70-[birth year]23, or history of blood transfusion before 1992)  · Pneumococcal Vaccine and Administration.    Outpatient Encounter Medications as of 1/26/2023   Medication Sig Dispense Refill   • amLODIPine (NORVASC) 2.5 MG tablet Take 1 tablet by mouth Daily. 90 tablet 1   • amLODIPine (NORVASC) 5 MG tablet Take 1 tablet by mouth Daily. 90 tablet 1   • atorvastatin (LIPITOR) 10 MG tablet Take 1 tablet by mouth Daily. 90 tablet 1   • cholecalciferol (VITAMIN D3) 25 MCG (1000 UT) tablet Take 2 tablets by mouth Daily.     • folic acid (FOLVITE) 1 MG tablet Take 1 tablet by mouth Daily. 90 tablet 1   • hydroCHLOROthiazide (HYDRODIURIL) 25 MG tablet Take 1 tablet by mouth Daily. 90 tablet 1   • latanoprost (XALATAN) 0.005 % ophthalmic solution 1 drop Every Night.     • levothyroxine (SYNTHROID, LEVOTHROID) 75 MCG tablet Take 1 tablet by mouth Daily. 90 tablet 1   • olmesartan (BENICAR) 40 MG tablet Take 1 tablet by mouth Daily. 90 tablet 1   • vitamin B-12 (CYANOCOBALAMIN) 1000 MCG tablet Take 1 tablet by mouth Every Other Day.     • [DISCONTINUED] amLODIPine (NORVASC) 5 MG tablet Take 1 tablet by mouth Daily. 90 tablet 1   • [DISCONTINUED] atorvastatin  (LIPITOR) 10 MG tablet Take 1 tablet by mouth Daily. 90 tablet 1   • [DISCONTINUED] cholecalciferol (VITAMIN D3) 25 MCG (1000 UT) tablet Take 2 tablets by mouth Daily.     • [DISCONTINUED] folic acid (FOLVITE) 1 MG tablet Take 1 tablet by mouth Daily. 90 tablet 1   • [DISCONTINUED] hydroCHLOROthiazide (HYDRODIURIL) 25 MG tablet Take 1 tablet by mouth Daily. 90 tablet 1   • [DISCONTINUED] levothyroxine (SYNTHROID, LEVOTHROID) 75 MCG tablet Take 1 tablet by mouth Daily. 90 tablet 1   • [DISCONTINUED] levothyroxine (SYNTHROID, LEVOTHROID) 75 MCG tablet      • [DISCONTINUED] olmesartan (BENICAR) 40 MG tablet Take 1 tablet by mouth Daily. 90 tablet 1   • [DISCONTINUED] olmesartan (BENICAR) 40 MG tablet      • [DISCONTINUED] vitamin B-12 (CYANOCOBALAMIN) 1000 MCG tablet Take 1 tablet by mouth Every Other Day.     • [DISCONTINUED] amLODIPine (NORVASC) 2.5 MG tablet Take 1 tablet by mouth once daily 90 tablet 0   • [DISCONTINUED] amLODIPine (NORVASC) 2.5 MG tablet        No facility-administered encounter medications on file as of 1/26/2023.       Reviewed use of high risk medication in the elderly: yes  Reviewed for potential of harmful drug interactions in the elderly: yes    Diagnoses and all orders for this visit:    1. General medical exam (Primary)  Assessment & Plan:  Reviewed together health maintenance and screening along with vaccination options at her Medicare annual wellness visit and physical exam today.    New problems addressed today included ongoing right anterior shoulder pain and biceps tendinitis.  Further work-up and treatment options reviewed together regarding her right shoulder pain and biceps tendinitis.  Given home exercises to start with recommendation for Tylenol.  If not improving she will call to get an order for physical therapy.    Encouraged her to get us a copy of her advanced directive.    Scheduling past-due DEXA.    Updated vaccinations with Prevnar 20.    Discussed hep C screening and  she will get this done with her next blood work.    Discussed positive microalbuminuria but she is already on olmesartan for hypertension.  Chronic kidney disease does remain in stage III but has improved compared to her last lab work.    She does understand the need to avoid NSAIDs given chronic kidney disease.          2. Screening for osteoporosis  -     DEXA Bone Density Axial; Future    3. Need for prophylactic vaccination against Streptococcus pneumoniae (pneumococcus)  -     Pneumococcal Conjugate Vaccine 20-Valent (PCV20)    4. Hypertension, essential  Assessment & Plan:  Hypertension is improving with treatment.  Continue current treatment regimen.  Blood pressure will be reassessed at the next regular appointment.    Orders:  -     amLODIPine (NORVASC) 2.5 MG tablet; Take 1 tablet by mouth Daily.  Dispense: 90 tablet; Refill: 1  -     amLODIPine (NORVASC) 5 MG tablet; Take 1 tablet by mouth Daily.  Dispense: 90 tablet; Refill: 1  -     hydroCHLOROthiazide (HYDRODIURIL) 25 MG tablet; Take 1 tablet by mouth Daily.  Dispense: 90 tablet; Refill: 1  -     olmesartan (BENICAR) 40 MG tablet; Take 1 tablet by mouth Daily.  Dispense: 90 tablet; Refill: 1  -     Comprehensive Metabolic Panel; Future  -     Lipid Panel; Future  -     TSH; Future  -     T4, Free; Future    5. Hyperlipidemia, mixed  Assessment & Plan:  Lipid abnormalities are improving with treatment.  Pharmacotherapy as ordered.  Lipids will be reassessed in 6 months.    Orders:  -     atorvastatin (LIPITOR) 10 MG tablet; Take 1 tablet by mouth Daily.  Dispense: 90 tablet; Refill: 1  -     Comprehensive Metabolic Panel; Future  -     Lipid Panel; Future  -     TSH; Future  -     T4, Free; Future    6. Hypothyroidism (acquired)  Assessment & Plan:  Continue Synthroid    Orders:  -     levothyroxine (SYNTHROID, LEVOTHROID) 75 MCG tablet; Take 1 tablet by mouth Daily.  Dispense: 90 tablet; Refill: 1  -     Comprehensive Metabolic Panel; Future  -      Lipid Panel; Future  -     TSH; Future  -     T4, Free; Future    7. Hyperglycemia  Assessment & Plan:  Continue to work on diet and exercise efforts.  Recent A1c was 5.8.    Orders:  -     POCT microalbumin  -     Hemoglobin A1c; Future    8. Vitamin B12 deficiency  Assessment & Plan:  Continues on B12 with folic acid    Orders:  -     folic acid (FOLVITE) 1 MG tablet; Take 1 tablet by mouth Daily.  Dispense: 90 tablet; Refill: 1  -     vitamin B-12 (CYANOCOBALAMIN) 1000 MCG tablet; Take 1 tablet by mouth Every Other Day.  -     CBC Auto Differential; Future  -     Vitamin B12; Future    9. Vitamin D deficiency  Assessment & Plan:  Continue vitamin D    Orders:  -     cholecalciferol (VITAMIN D3) 25 MCG (1000 UT) tablet; Take 2 tablets by mouth Daily.  -     Vitamin D,25-Hydroxy; Future    10. Glaucoma of both eyes, unspecified glaucoma type  Assessment & Plan:  Continue ophthalmology follow-up      11. Microalbuminuria  Assessment & Plan:  Continue on olmesartan      12. Encounter for follow-up examination after completed treatment for conditions other than malignant neoplasm  -     DEXA Bone Density Axial; Future    13. Need for hepatitis C screening test  -     HCV Antibody Rfx To Qnt PCR; Future    14. Folic acid deficiency  -     CBC Auto Differential; Future  -     Folate; Future    15. Stage 3b chronic kidney disease (HCC)  Assessment & Plan:  Kidney function improving.    Discussed no NSAIDs over-the-counter.    Continue with management of hypertension, hyperlipidemia, and prediabetes.    Continue on losartan for hypertension and renal protection given positive microalbuminuria today.      16. Acute pain of right shoulder  Assessment & Plan:  History and exam suggestive for biceps tendinitis with possible adhesive capsulitis.    Further work-up discussed.  We will start with home exercises.  Printed and reviewed home exercises with her today.  She may use Tylenol for pain.    If not improving she will call  for an order to get started with physical therapy.    This was a new issue address today.  She had plan to make an appointment for this ongoing problem even if she did not have her annual wellness visit or physical exam today.      17. Biceps tendinitis of right upper extremity  Assessment & Plan:  See above        Follow Up:  Return in about 6 months (around 7/26/2023) for Med recheck, Fasting labs 1 week before apt (Drink water).     There are no Patient Instructions on file for this visit.    An After Visit Summary and PPPS with all of these plans were given to the patient.

## 2023-01-26 NOTE — ASSESSMENT & PLAN NOTE
Kidney function improving.    Discussed no NSAIDs over-the-counter.    Continue with management of hypertension, hyperlipidemia, and prediabetes.    Continue on losartan for hypertension and renal protection given positive microalbuminuria today.

## 2023-07-12 PROBLEM — R22.41: Status: ACTIVE | Noted: 2023-07-12

## 2023-07-26 ENCOUNTER — OFFICE VISIT (OUTPATIENT)
Dept: FAMILY MEDICINE CLINIC | Facility: CLINIC | Age: 78
End: 2023-07-26
Payer: MEDICARE

## 2023-07-26 VITALS
SYSTOLIC BLOOD PRESSURE: 138 MMHG | BODY MASS INDEX: 27.26 KG/M2 | OXYGEN SATURATION: 98 % | DIASTOLIC BLOOD PRESSURE: 62 MMHG | WEIGHT: 163.6 LBS | HEIGHT: 65 IN

## 2023-07-26 DIAGNOSIS — E53.8 VITAMIN B12 DEFICIENCY: Chronic | ICD-10-CM

## 2023-07-26 DIAGNOSIS — I10 HYPERTENSION, ESSENTIAL: Primary | Chronic | ICD-10-CM

## 2023-07-26 DIAGNOSIS — E55.9 VITAMIN D DEFICIENCY: Chronic | ICD-10-CM

## 2023-07-26 DIAGNOSIS — H40.9 GLAUCOMA OF BOTH EYES, UNSPECIFIED GLAUCOMA TYPE: Chronic | ICD-10-CM

## 2023-07-26 DIAGNOSIS — R80.9 MICROALBUMINURIA: Chronic | ICD-10-CM

## 2023-07-26 DIAGNOSIS — N18.32 STAGE 3B CHRONIC KIDNEY DISEASE: Chronic | ICD-10-CM

## 2023-07-26 DIAGNOSIS — M10.271 ACUTE DRUG-INDUCED GOUT INVOLVING TOE OF RIGHT FOOT: ICD-10-CM

## 2023-07-26 DIAGNOSIS — E78.2 HYPERLIPIDEMIA, MIXED: Chronic | ICD-10-CM

## 2023-07-26 DIAGNOSIS — E03.9 HYPOTHYROIDISM (ACQUIRED): Chronic | ICD-10-CM

## 2023-07-26 DIAGNOSIS — R73.9 HYPERGLYCEMIA: Chronic | ICD-10-CM

## 2023-07-26 PROBLEM — Z13.820 SCREENING FOR OSTEOPOROSIS: Status: RESOLVED | Noted: 2023-01-26 | Resolved: 2023-07-26

## 2023-07-26 PROBLEM — Z11.59 NEED FOR HEPATITIS C SCREENING TEST: Status: RESOLVED | Noted: 2023-01-26 | Resolved: 2023-07-26

## 2023-07-26 PROBLEM — M25.511 ACUTE PAIN OF RIGHT SHOULDER: Status: RESOLVED | Noted: 2023-01-26 | Resolved: 2023-07-26

## 2023-07-26 PROBLEM — R22.41: Status: RESOLVED | Noted: 2023-07-12 | Resolved: 2023-07-26

## 2023-07-26 PROBLEM — M75.21 BICEPS TENDINITIS OF RIGHT UPPER EXTREMITY: Status: RESOLVED | Noted: 2023-01-26 | Resolved: 2023-07-26

## 2023-07-26 PROBLEM — Z23 NEED FOR PROPHYLACTIC VACCINATION AGAINST STREPTOCOCCUS PNEUMONIAE (PNEUMOCOCCUS): Status: RESOLVED | Noted: 2023-01-26 | Resolved: 2023-07-26

## 2023-07-26 RX ORDER — COLCHICINE 0.6 MG/1
0.6 TABLET ORAL 2 TIMES DAILY PRN
Qty: 30 TABLET | Refills: 3 | Status: SHIPPED | OUTPATIENT
Start: 2023-07-26

## 2023-07-26 RX ORDER — AMLODIPINE BESYLATE 2.5 MG/1
2.5 TABLET ORAL DAILY
Qty: 90 TABLET | Refills: 1 | Status: SHIPPED | OUTPATIENT
Start: 2023-07-26

## 2023-07-26 RX ORDER — MELATONIN
2000 DAILY
Start: 2023-07-26

## 2023-07-26 RX ORDER — OLMESARTAN MEDOXOMIL 40 MG/1
40 TABLET ORAL DAILY
Qty: 90 TABLET | Refills: 1 | Status: SHIPPED | OUTPATIENT
Start: 2023-07-26

## 2023-07-26 RX ORDER — FOLIC ACID 1 MG/1
1 TABLET ORAL DAILY
Qty: 90 TABLET | Refills: 1 | Status: SHIPPED | OUTPATIENT
Start: 2023-07-26

## 2023-07-26 RX ORDER — LEVOTHYROXINE SODIUM 0.07 MG/1
75 TABLET ORAL DAILY
Qty: 90 TABLET | Refills: 1 | Status: SHIPPED | OUTPATIENT
Start: 2023-07-26

## 2023-07-26 RX ORDER — LANOLIN ALCOHOL/MO/W.PET/CERES
1000 CREAM (GRAM) TOPICAL EVERY OTHER DAY
Start: 2023-07-26

## 2023-07-26 RX ORDER — ATORVASTATIN CALCIUM 10 MG/1
10 TABLET, FILM COATED ORAL DAILY
Qty: 90 TABLET | Refills: 1 | Status: SHIPPED | OUTPATIENT
Start: 2023-07-26

## 2023-07-26 RX ORDER — AMLODIPINE BESYLATE 5 MG/1
5 TABLET ORAL DAILY
Qty: 90 TABLET | Refills: 1 | Status: SHIPPED | OUTPATIENT
Start: 2023-07-26

## 2023-07-26 NOTE — ASSESSMENT & PLAN NOTE
Hypertension is improving with treatment.  Medication changes per orders.  Blood pressure will be reassessed at the next regular appointment.    Stop hydrochlorothiazide given recent gout flareup.  Monitor blood pressure at home and notify us if readings are staying over 140/90 for medication adjustment

## 2023-07-26 NOTE — ASSESSMENT & PLAN NOTE
Kidney function stable compared to past labs and overall improved compared to lab work done 1 year ago.    We again discussed the need to avoid NSAIDs over-the-counter given her chronic kidney disease.    Continue with management of hypertension, hyperlipidemia, and prediabetes.    Continue on olmesartan for hypertension and renal protection given positive microalbuminuria

## 2023-07-26 NOTE — PROGRESS NOTES
Chief Complaint  Med Refill    Subjective    History of Present Illness:  Cecilia Crawley is a 78 y.o. female who presents today for 6-month follow-up visit and medication refills.    She has been doing well since our last visit together in January for Medicare annual wellness visit and physical exam together.    She did have a flareup with gout in her right great toe and uric acid level did return elevated above 9.  We did discuss gout today in detail and need to stop her hydrochlorothiazide given concerns that this is contributing to her gout risk.  She will monitor her blood pressure at home and continue with Norvasc and olmesartan and let us know if blood pressure is getting above 140/90 off hydrochlorothiazide.  She would like colchicine to use as needed if she has flareups.     She did have blood work done before today's appointment and it showed ongoing stage IIIb chronic kidney disease related to hypertension and diabetes.  This has remained stable compared to her most recent lab work but has improved compared to her blood work 1 year ago.  Her creatinine 1 year ago was 1.62 and has improved to 1.46.  GFR was 36.    She does understand the need to avoid NSAIDs given chronic kidney disease.      She did have a microalbumin test at her Medicare wellness visit in January that returned positive (20).  She does continue on olmesartan for hypertension which also helps with renal protection.     She continues to work hard on diet and exercise efforts given concerns for prediabetes.     Her last A1c was down to 5.9!.      Blood pressure remains well controlled on amlodipine, hydrochlorothiazide, and Benicar.  We are going to again stop hydrochlorothiazide given her gout flareup since her last appointment with me.     Continues with ophthalmology follow-up for glaucoma.     On B12 and folic acid along with vitamin D given past history of vitamin deficiencies.     Continues atorvastatin for hyperlipidemia.  Her most  "recent lipid panel done last week showed LDL at 64.     On Synthroid for hypothyroidism with no missed doses.  Recent thyroid studies returned normal with blood work done last week.     She reports she does have an advance directive and will get us a copy for chart update.     DEXA up-to-date for osteoporosis screening January 2023.  It returned normal.  Plan to repeat in January 2025.       Mammogram up-to-date at [facility] June 22, 2023..       Objective   Vital Signs:   /62   Ht 165.1 cm (65\")   Wt 74.2 kg (163 lb 9.6 oz)   SpO2 98%   BMI 27.22 kg/m²     Review of Systems   Constitutional:  Negative for appetite change, chills and fever.   HENT:  Negative for hearing loss.    Eyes:  Negative for blurred vision.   Respiratory:  Negative for chest tightness.    Cardiovascular:  Negative for chest pain.   Gastrointestinal:  Negative for abdominal pain.   Musculoskeletal:  Positive for arthralgias. Negative for gait problem.   Skin:  Negative for rash.   Psychiatric/Behavioral:  Negative for depressed mood.      Past History:  Medical History: has no past medical history on file.   Surgical History: has a past surgical history that includes Cataract extraction.   Family History: family history is not on file.   Social History: reports that she quit smoking about 25 years ago. Her smoking use included cigarettes. She has a 20.00 pack-year smoking history. She has never used smokeless tobacco. She reports that she does not currently use alcohol. She reports that she does not use drugs.      Current Outpatient Medications:     amLODIPine (NORVASC) 2.5 MG tablet, Take 1 tablet by mouth Daily., Disp: 90 tablet, Rfl: 1    amLODIPine (NORVASC) 5 MG tablet, Take 1 tablet by mouth Daily., Disp: 90 tablet, Rfl: 1    atorvastatin (LIPITOR) 10 MG tablet, Take 1 tablet by mouth Daily., Disp: 90 tablet, Rfl: 1    cholecalciferol (VITAMIN D3) 25 MCG (1000 UT) tablet, Take 2 tablets by mouth Daily., " Disp: , Rfl:     folic acid (FOLVITE) 1 MG tablet, Take 1 tablet by mouth Daily., Disp: 90 tablet, Rfl: 1    latanoprost (XALATAN) 0.005 % ophthalmic solution, 1 drop Every Night., Disp: , Rfl:     levothyroxine (SYNTHROID, LEVOTHROID) 75 MCG tablet, Take 1 tablet by mouth Daily., Disp: 90 tablet, Rfl: 1    olmesartan (BENICAR) 40 MG tablet, Take 1 tablet by mouth Daily., Disp: 90 tablet, Rfl: 1    vitamin B-12 (CYANOCOBALAMIN) 1000 MCG tablet, Take 1 tablet by mouth Every Other Day., Disp: , Rfl:     colchicine 0.6 MG tablet, Take 1 tablet by mouth 2 (Two) Times a Day As Needed for Muscle / Joint Pain (gout flareup)., Disp: 30 tablet, Rfl: 3    Allergies: Beta adrenergic blockers, Hydrochlorothiazide, Prazosin, and Sulfa antibiotics    Physical Exam  Constitutional:       Appearance: Normal appearance.   HENT:      Head: Normocephalic.      Right Ear: External ear normal.      Left Ear: External ear normal.      Nose: Nose normal.   Eyes:      Pupils: Pupils are equal, round, and reactive to light.   Cardiovascular:      Rate and Rhythm: Normal rate and regular rhythm.      Heart sounds: Normal heart sounds.   Pulmonary:      Effort: Pulmonary effort is normal.      Breath sounds: Normal breath sounds.   Musculoskeletal:         General: Normal range of motion.      Cervical back: Normal range of motion.   Skin:     General: Skin is warm and dry.   Neurological:      General: No focal deficit present.      Mental Status: She is alert.   Psychiatric:         Mood and Affect: Mood normal.         Behavior: Behavior normal.         Thought Content: Thought content normal.        Result Review                   Assessment and Plan  Diagnoses and all orders for this visit:    1. Hypertension, essential (Primary)  Assessment & Plan:  Hypertension is improving with treatment.  Medication changes per orders.  Blood pressure will be reassessed at the next regular appointment.    Stop hydrochlorothiazide given recent gout  flareup.  Monitor blood pressure at home and notify us if readings are staying over 140/90 for medication adjustment    Orders:  -     Comprehensive Metabolic Panel; Future  -     Lipid Panel; Future  -     TSH; Future  -     T4, Free; Future  -     amLODIPine (NORVASC) 2.5 MG tablet; Take 1 tablet by mouth Daily.  Dispense: 90 tablet; Refill: 1  -     amLODIPine (NORVASC) 5 MG tablet; Take 1 tablet by mouth Daily.  Dispense: 90 tablet; Refill: 1  -     olmesartan (BENICAR) 40 MG tablet; Take 1 tablet by mouth Daily.  Dispense: 90 tablet; Refill: 1    2. Hyperlipidemia, mixed  Assessment & Plan:  Lipid abnormalities are improving with treatment.  Pharmacotherapy as ordered.  Lipids will be reassessed in 6 months.    Orders:  -     Comprehensive Metabolic Panel; Future  -     Lipid Panel; Future  -     TSH; Future  -     T4, Free; Future  -     atorvastatin (LIPITOR) 10 MG tablet; Take 1 tablet by mouth Daily.  Dispense: 90 tablet; Refill: 1    3. Hypothyroidism (acquired)  Assessment & Plan:  Continue Synthroid    Orders:  -     Comprehensive Metabolic Panel; Future  -     Lipid Panel; Future  -     TSH; Future  -     T4, Free; Future  -     levothyroxine (SYNTHROID, LEVOTHROID) 75 MCG tablet; Take 1 tablet by mouth Daily.  Dispense: 90 tablet; Refill: 1    4. Hyperglycemia  Assessment & Plan:  Continue to work on diet and exercise efforts.  Recent A1c was 5.9.    Orders:  -     Hemoglobin A1c; Future    5. Microalbuminuria  Assessment & Plan:  Continue on olmesartan      6. Stage 3b chronic kidney disease  Assessment & Plan:  Kidney function stable compared to past labs and overall improved compared to lab work done 1 year ago.    We again discussed the need to avoid NSAIDs over-the-counter given her chronic kidney disease.    Continue with management of hypertension, hyperlipidemia, and prediabetes.    Continue on olmesartan for hypertension and renal protection given positive microalbuminuria     Orders:  -      Comprehensive Metabolic Panel; Future  -     Lipid Panel; Future  -     TSH; Future  -     T4, Free; Future    7. Vitamin B12 deficiency  Assessment & Plan:  Continues on B12 with folic acid    Orders:  -     folic acid (FOLVITE) 1 MG tablet; Take 1 tablet by mouth Daily.  Dispense: 90 tablet; Refill: 1  -     vitamin B-12 (CYANOCOBALAMIN) 1000 MCG tablet; Take 1 tablet by mouth Every Other Day.    8. Vitamin D deficiency  Assessment & Plan:  Continue vitamin D    Orders:  -     cholecalciferol (VITAMIN D3) 25 MCG (1000 UT) tablet; Take 2 tablets by mouth Daily.    9. Glaucoma of both eyes, unspecified glaucoma type  Assessment & Plan:  Continue ophthalmology follow-up      10. Acute drug-induced gout involving toe of right foot  Assessment & Plan:  Discussed concerns for hydrochlorothiazide contributing to her gout flareup.  She does have a family history of gout in her mother.  We did discuss dietary changes to help with gout prevention and she is hilaria work on diet and activity to help with gout prevention.    Plan to recheck uric acid before next follow-up visit after she has discontinued hydrochlorothiazide.    Hydrochlorothiazide allergy list so we do not use this in the future given her recent gout attack.    Questions answered.  Given colchicine to use as needed if needed for flareups.    Plan for recheck in 6 months if doing well or sooner if problems arise.    Orders:  -     Uric acid; Future  -     colchicine 0.6 MG tablet; Take 1 tablet by mouth 2 (Two) Times a Day As Needed for Muscle / Joint Pain (gout flareup).  Dispense: 30 tablet; Refill: 3                   Follow Up  Return in about 6 months (around 1/29/2024) for Med recheck, Fasting labs 1 week before apt (Drink water).    Kalyan Rocha MD

## 2023-07-26 NOTE — ASSESSMENT & PLAN NOTE
Discussed concerns for hydrochlorothiazide contributing to her gout flareup.  She does have a family history of gout in her mother.  We did discuss dietary changes to help with gout prevention and she is hilaria work on diet and activity to help with gout prevention.    Plan to recheck uric acid before next follow-up visit after she has discontinued hydrochlorothiazide.    Hydrochlorothiazide allergy list so we do not use this in the future given her recent gout attack.    Questions answered.  Given colchicine to use as needed if needed for flareups.    Plan for recheck in 6 months if doing well or sooner if problems arise.

## 2023-08-31 ENCOUNTER — TELEPHONE (OUTPATIENT)
Dept: FAMILY MEDICINE CLINIC | Facility: CLINIC | Age: 78
End: 2023-08-31

## 2023-08-31 NOTE — TELEPHONE ENCOUNTER
Caller: Cecilia Crawley     Relationship: [unfilled]     Best call back number: [phone]     What is your medical concern? FOOT STILL SWOLLEN AND HURTS TO PUT PRESSURE ON IT . GOUT IS GONE BUT THIS HAS REMAINED. PLEASE CALL TO DISCUSS     How long has this issue been going on? OVER A WEEK     Is your provider already aware of this issue? YES     Have you been treated for this issue? YES

## 2023-11-22 ENCOUNTER — TELEPHONE (OUTPATIENT)
Dept: FAMILY MEDICINE CLINIC | Facility: CLINIC | Age: 78
End: 2023-11-22

## 2023-11-22 NOTE — TELEPHONE ENCOUNTER
Caller: Cecilia Crawley    Relationship: Self    Best call back number: [phone]     What is the best time to reach you: ANYTIME, OK TO LEAVE VOICEMAIL.    Who are you requesting to speak with (clinical staff, provider,  specific staff member): CLINICAL STAFF    Do you know the name of the person who called: PATIENT    What was the call regarding: PATIENT IS CALLING TO ADVISE SINCE SHE HAS STOPPED TAKING THE HYDROCHLOROTHIAZIDE 25 MG, AND HER FEET IS SWELLING. PATIENT WOULD LIKE TO KNOW IF SHE NEEDS TO START TAKING THIS MEDICATION AGAIN. PLEASE ADVISE.    Is it okay if the provider responds through MyChart: NO CALL ONLY

## 2023-11-27 NOTE — TELEPHONE ENCOUNTER
We stopped her HCTZ given gout risk with her HCTZ medication.    It would be best to stay off the fluid pills (given gout risk) and she can try compression stockings (I would recommend the otc compression stockings from [facility] pharmacy) and she try and work more with regular exercise and compression stockings instead of restarting the fluid pill    If that is not working she can try adding back just 1/2 tab of the hctz daily for swelling but will be at higher risk for gout attacks adding that back in

## 2024-01-15 DIAGNOSIS — E03.9 HYPOTHYROIDISM (ACQUIRED): Chronic | ICD-10-CM

## 2024-01-15 RX ORDER — LEVOTHYROXINE SODIUM 0.07 MG/1
75 TABLET ORAL DAILY
Qty: 90 TABLET | Refills: 0 | Status: SHIPPED | OUTPATIENT
Start: 2024-01-15

## 2024-01-22 ENCOUNTER — LAB (OUTPATIENT)
Dept: FAMILY MEDICINE CLINIC | Facility: CLINIC | Age: 79
End: 2024-01-22
Payer: MEDICARE

## 2024-01-22 DIAGNOSIS — E03.9 HYPOTHYROIDISM (ACQUIRED): Chronic | ICD-10-CM

## 2024-01-22 DIAGNOSIS — E78.2 HYPERLIPIDEMIA, MIXED: Chronic | ICD-10-CM

## 2024-01-22 DIAGNOSIS — I10 HYPERTENSION, ESSENTIAL: Chronic | ICD-10-CM

## 2024-01-22 DIAGNOSIS — R73.9 HYPERGLYCEMIA: Chronic | ICD-10-CM

## 2024-01-22 DIAGNOSIS — M10.271 ACUTE DRUG-INDUCED GOUT INVOLVING TOE OF RIGHT FOOT: ICD-10-CM

## 2024-01-22 DIAGNOSIS — N18.32 STAGE 3B CHRONIC KIDNEY DISEASE: Chronic | ICD-10-CM

## 2024-01-22 PROCEDURE — 36415 COLL VENOUS BLD VENIPUNCTURE: CPT | Performed by: FAMILY MEDICINE

## 2024-01-23 LAB
ALBUMIN SERPL-MCNC: 4.3 G/DL (ref 3.8–4.8)
ALBUMIN/GLOB SERPL: 1.9 {RATIO} (ref 1.2–2.2)
ALP SERPL-CCNC: 73 IU/L (ref 44–121)
ALT SERPL-CCNC: 15 IU/L (ref 0–32)
AST SERPL-CCNC: 23 IU/L (ref 0–40)
BILIRUB SERPL-MCNC: 0.3 MG/DL (ref 0–1.2)
BUN SERPL-MCNC: 24 MG/DL (ref 8–27)
BUN/CREAT SERPL: 19 (ref 12–28)
CALCIUM SERPL-MCNC: 9.4 MG/DL (ref 8.7–10.3)
CHLORIDE SERPL-SCNC: 110 MMOL/L (ref 96–106)
CHOLEST SERPL-MCNC: 197 MG/DL (ref 100–199)
CO2 SERPL-SCNC: 24 MMOL/L (ref 20–29)
CREAT SERPL-MCNC: 1.25 MG/DL (ref 0.57–1)
EGFRCR SERPLBLD CKD-EPI 2021: 44 ML/MIN/1.73
GLOBULIN SER CALC-MCNC: 2.3 G/DL (ref 1.5–4.5)
GLUCOSE SERPL-MCNC: 86 MG/DL (ref 70–99)
HBA1C MFR BLD: 5.8 % (ref 4.8–5.6)
HDLC SERPL-MCNC: 55 MG/DL
LDLC SERPL CALC-MCNC: 104 MG/DL (ref 0–99)
POTASSIUM SERPL-SCNC: 4.6 MMOL/L (ref 3.5–5.2)
PROT SERPL-MCNC: 6.6 G/DL (ref 6–8.5)
SODIUM SERPL-SCNC: 148 MMOL/L (ref 134–144)
T4 FREE SERPL-MCNC: 1.14 NG/DL (ref 0.82–1.77)
TRIGL SERPL-MCNC: 220 MG/DL (ref 0–149)
TSH SERPL DL<=0.005 MIU/L-ACNC: 3.36 UIU/ML (ref 0.45–4.5)
URATE SERPL-MCNC: 7.3 MG/DL (ref 3.1–7.9)
VLDLC SERPL CALC-MCNC: 38 MG/DL (ref 5–40)

## 2024-01-28 DIAGNOSIS — E53.8 VITAMIN B12 DEFICIENCY: Chronic | ICD-10-CM

## 2024-01-29 ENCOUNTER — OFFICE VISIT (OUTPATIENT)
Dept: FAMILY MEDICINE CLINIC | Facility: CLINIC | Age: 79
End: 2024-01-29
Payer: MEDICARE

## 2024-01-29 VITALS
DIASTOLIC BLOOD PRESSURE: 70 MMHG | SYSTOLIC BLOOD PRESSURE: 144 MMHG | BODY MASS INDEX: 27.79 KG/M2 | HEART RATE: 53 BPM | OXYGEN SATURATION: 99 % | WEIGHT: 167 LBS

## 2024-01-29 DIAGNOSIS — Z00.00 GENERAL MEDICAL EXAM: Primary | ICD-10-CM

## 2024-01-29 DIAGNOSIS — E53.8 FOLIC ACID DEFICIENCY: ICD-10-CM

## 2024-01-29 DIAGNOSIS — E55.9 VITAMIN D DEFICIENCY: Chronic | ICD-10-CM

## 2024-01-29 DIAGNOSIS — E03.9 HYPOTHYROIDISM (ACQUIRED): Chronic | ICD-10-CM

## 2024-01-29 DIAGNOSIS — E66.3 OVERWEIGHT (BMI 25.0-29.9): ICD-10-CM

## 2024-01-29 DIAGNOSIS — N18.31 STAGE 3A CHRONIC KIDNEY DISEASE: Chronic | ICD-10-CM

## 2024-01-29 DIAGNOSIS — H40.9 GLAUCOMA OF BOTH EYES, UNSPECIFIED GLAUCOMA TYPE: Chronic | ICD-10-CM

## 2024-01-29 DIAGNOSIS — E53.8 VITAMIN B12 DEFICIENCY: Chronic | ICD-10-CM

## 2024-01-29 DIAGNOSIS — M10.271 ACUTE DRUG-INDUCED GOUT INVOLVING TOE OF RIGHT FOOT: ICD-10-CM

## 2024-01-29 DIAGNOSIS — R73.9 HYPERGLYCEMIA: Chronic | ICD-10-CM

## 2024-01-29 DIAGNOSIS — R80.9 MICROALBUMINURIA: Chronic | ICD-10-CM

## 2024-01-29 DIAGNOSIS — I10 HYPERTENSION, ESSENTIAL: Chronic | ICD-10-CM

## 2024-01-29 DIAGNOSIS — E78.2 HYPERLIPIDEMIA, MIXED: Chronic | ICD-10-CM

## 2024-01-29 LAB
EXPIRATION DATE: ABNORMAL
Lab: ABNORMAL
POC CREATININE URINE: 300
POC MICROALBUMIN URINE: 150

## 2024-01-29 PROCEDURE — 96160 PT-FOCUSED HLTH RISK ASSMT: CPT | Performed by: FAMILY MEDICINE

## 2024-01-29 PROCEDURE — 3078F DIAST BP <80 MM HG: CPT | Performed by: FAMILY MEDICINE

## 2024-01-29 PROCEDURE — G0439 PPPS, SUBSEQ VISIT: HCPCS | Performed by: FAMILY MEDICINE

## 2024-01-29 PROCEDURE — 3077F SYST BP >= 140 MM HG: CPT | Performed by: FAMILY MEDICINE

## 2024-01-29 PROCEDURE — 1159F MED LIST DOCD IN RCRD: CPT | Performed by: FAMILY MEDICINE

## 2024-01-29 PROCEDURE — 82044 UR ALBUMIN SEMIQUANTITATIVE: CPT | Performed by: FAMILY MEDICINE

## 2024-01-29 PROCEDURE — 1160F RVW MEDS BY RX/DR IN RCRD: CPT | Performed by: FAMILY MEDICINE

## 2024-01-29 PROCEDURE — 99397 PER PM REEVAL EST PAT 65+ YR: CPT | Performed by: FAMILY MEDICINE

## 2024-01-29 PROCEDURE — 99214 OFFICE O/P EST MOD 30 MIN: CPT | Performed by: FAMILY MEDICINE

## 2024-01-29 RX ORDER — MELATONIN
2000 DAILY
Start: 2024-01-29

## 2024-01-29 RX ORDER — FOLIC ACID 1 MG/1
1 TABLET ORAL DAILY
Qty: 90 TABLET | Refills: 2 | Status: SHIPPED | OUTPATIENT
Start: 2024-01-29

## 2024-01-29 RX ORDER — FOLIC ACID 1 MG/1
1000 TABLET ORAL DAILY
Qty: 90 TABLET | Refills: 0 | OUTPATIENT
Start: 2024-01-29

## 2024-01-29 RX ORDER — ATORVASTATIN CALCIUM 20 MG/1
20 TABLET, FILM COATED ORAL DAILY
Qty: 90 TABLET | Refills: 2 | Status: SHIPPED | OUTPATIENT
Start: 2024-01-29

## 2024-01-29 RX ORDER — AMLODIPINE AND VALSARTAN 10; 320 MG/1; MG/1
1 TABLET ORAL DAILY
Qty: 90 TABLET | Refills: 2 | Status: SHIPPED | OUTPATIENT
Start: 2024-01-29

## 2024-01-29 RX ORDER — LANOLIN ALCOHOL/MO/W.PET/CERES
1000 CREAM (GRAM) TOPICAL EVERY OTHER DAY
Start: 2024-01-29

## 2024-01-29 RX ORDER — LEVOTHYROXINE SODIUM 0.07 MG/1
75 TABLET ORAL DAILY
Qty: 90 TABLET | Refills: 2 | Status: SHIPPED | OUTPATIENT
Start: 2024-01-29

## 2024-01-29 NOTE — ASSESSMENT & PLAN NOTE
Patient's (Body mass index is 27.79 kg/m².) indicates that they are overweight with health conditions that include hypertension and dyslipidemias . Weight is improving with lifestyle modifications. BMI is is above average; BMI management plan is completed. We discussed portion control and increasing exercise.

## 2024-01-29 NOTE — ASSESSMENT & PLAN NOTE
Renal condition is improving with treatment.  Regular aerobic exercise.  Medication changes per orders.  Renal condition will be reassessed in 6 months.

## 2024-01-29 NOTE — ASSESSMENT & PLAN NOTE
Lipid abnormalities are worsening.  Pharmacotherapy as ordered.  Lipids will be reassessed in 6 months    Reviewed labs - we will go up to 20mg with atorvastatin.

## 2024-01-29 NOTE — PROGRESS NOTES
The ABCs of the Annual Wellness Visit  Subsequent Medicare Wellness Visit    Subjective    Cecilia Crawley is a 78 y.o. female who presents for a Subsequent Medicare Wellness Visit.    The following portions of the patient's history were reviewed and   updated as appropriate: allergies, current medications, past family history, past medical history, past social history, past surgical history, and problem list.    Compared to one year ago, the patient feels her physical   health is the same.    Compared to one year ago, the patient feels her mental   health is the same.    Recent Hospitalizations:  She was not admitted to the hospital during the last year.       Current Medical Providers:  Patient Care Team:  Kalyan Rocha MD as PCP - General (Family Medicine)    [facility] - Glaucoma and diabetic eye exam followup.  Last visit Jan 2024    Outpatient Medications Prior to Visit   Medication Sig Dispense Refill    colchicine 0.6 MG tablet Take 1 tablet by mouth 2 (Two) Times a Day As Needed for Muscle / Joint Pain (gout flareup). 30 tablet 3    amLODIPine (NORVASC) 2.5 MG tablet Take 1 tablet by mouth Daily. 90 tablet 1    amLODIPine (NORVASC) 5 MG tablet Take 1 tablet by mouth Daily. 90 tablet 1    atorvastatin (LIPITOR) 10 MG tablet Take 1 tablet by mouth Daily. 90 tablet 1    cholecalciferol (VITAMIN D3) 25 MCG (1000 UT) tablet Take 2 tablets by mouth Daily.      folic acid (FOLVITE) 1 MG tablet Take 1 tablet by mouth Daily. 90 tablet 1    levothyroxine (SYNTHROID, LEVOTHROID) 75 MCG tablet Take 1 tablet by mouth once daily 90 tablet 0    olmesartan (BENICAR) 40 MG tablet Take 1 tablet by mouth Daily. 90 tablet 1    vitamin B-12 (CYANOCOBALAMIN) 1000 MCG tablet Take 1 tablet by mouth Every Other Day.      latanoprost (XALATAN) 0.005 % ophthalmic solution 1 drop Every Night. (Patient not taking: Reported on 1/29/2024)       No facility-administered medications prior to visit.       No opioid  "medication identified on active medication list. I have reviewed chart for other potential  high risk medication/s and harmful drug interactions in the elderly.        Aspirin is not on active medication list.  Aspirin use is not indicated based on review of current medical condition/s. Risk of harm outweighs potential benefits.  .    Patient Active Problem List   Diagnosis    Hypertension, essential    Hyperlipidemia, mixed    Vitamin D deficiency    Glaucoma of both eyes    Hypothyroidism (acquired)    Vitamin B12 deficiency    Hyperglycemia    Microalbuminuria    General medical exam    Stage 3a chronic kidney disease    Acute drug-induced gout involving toe of right foot    Overweight (BMI 25.0-29.9)     Advance Care Planning   Advance Care Planning     Advance Directive is not on file.  ACP discussion was held with the patient during this visit. Patient does not have an advance directive, information provided.     Objective    Vitals:    24 0802 24 0824   BP: 160/82 144/70   Pulse: 53    SpO2: 99%    Weight: 75.8 kg (167 lb)      Estimated body mass index is 27.79 kg/m² as calculated from the following:    Height as of 23: 165.1 cm (65\").    Weight as of this encounter: 75.8 kg (167 lb).    BMI is >= 25 and <30. (Overweight) The following options were offered after discussion;: exercise counseling/recommendations and nutrition counseling/recommendations      Does the patient have evidence of cognitive impairment? No    Lab Results   Component Value Date    CHLPL 197 2024    TRIG 220 (H) 2024    HDL 55 2024     (H) 2024    VLDL 38 2024    HGBA1C 5.8 (H) 2024        HEALTH RISK ASSESSMENT    Smoking Status:  Social History     Tobacco Use   Smoking Status Former    Packs/day: 0.50    Years: 40.00    Additional pack years: 0.00    Total pack years: 20.00    Types: Cigarettes    Quit date:     Years since quittin.0   Smokeless Tobacco Never "     Alcohol Consumption:  Social History     Substance and Sexual Activity   Alcohol Use Not Currently     Fall Risk Screen:    KRISTENADI Fall Risk Assessment was completed, and patient is at LOW risk for falls.Assessment completed on:2024    Depression Screenin/29/2024     8:33 AM   PHQ-2/PHQ-9 Depression Screening   Little Interest or Pleasure in Doing Things 0-->not at all   Feeling Down, Depressed or Hopeless 0-->not at all   PHQ-9: Brief Depression Severity Measure Score 0       Health Habits and Functional and Cognitive Screenin/26/2023     8:17 AM   Functional & Cognitive Status   Do you have difficulty preparing food and eating? No   Do you have difficulty bathing yourself, getting dressed or grooming yourself? No   Do you have difficulty using the toilet? No   Do you have difficulty moving around from place to place? No   Do you have trouble with steps or getting out of a bed or a chair? No   Current Diet Well Balanced Diet   Dental Exam Not up to date   Eye Exam Up to date   Exercise (times per week) 3 times per week   Current Exercises Include Walking   Do you need help using the phone?  No   Are you deaf or do you have serious difficulty hearing?  No   Do you need help to go to places out of walking distance? No   Do you need help shopping? No   Do you need help preparing meals?  No   Do you need help with housework?  No   Do you need help with laundry? No   Do you need help taking your medications? No   Do you need help managing money? No   Do you ever drive or ride in a car without wearing a seat belt? No   Have you felt unusual stress, anger or loneliness in the last month? No   Who do you live with? Spouse   If you need help, do you have trouble finding someone available to you? No   Have you been bothered in the last four weeks by sexual problems? No   Do you have difficulty concentrating, remembering or making decisions? No       Age-appropriate Screening Schedule:  Refer to the  list below for future screening recommendations based on patient's age, sex and/or medical conditions. Orders for these recommended tests are listed in the plan section. The patient has been provided with a written plan.    Health Maintenance   Topic Date Due    HEMOGLOBIN A1C  07/22/2024    DIABETIC EYE EXAM  01/15/2025    LIPID PANEL  01/22/2025    ANNUAL WELLNESS VISIT  01/29/2025    URINE MICROALBUMIN  01/29/2025    BMI FOLLOWUP  01/29/2025    DXA SCAN  01/31/2025    COVID-19 Vaccine  Completed    INFLUENZA VACCINE  Completed    Pneumococcal Vaccine 65+  Completed    TDAP/TD VACCINES  Discontinued    ZOSTER VACCINE  Discontinued                  CMS Preventative Services Quick Reference  Risk Factors Identified During Encounter  Fall Risk-High or Moderate: Discussed Fall Prevention in the home  The above risks/problems have been discussed with the patient.  Pertinent information has been shared with the patient in the After Visit Summary.  An After Visit Summary and PPPS were made available to the patient.    Follow Up:   Next Medicare Wellness visit to be scheduled in 1 year.       Additional E&M Note during same encounter follows:  Patient has multiple medical problems which are significant and separately identifiable that require additional work above and beyond the Medicare Wellness Visit.      Chief Complaint  medication recheck    Subjective        HPI  Cecilia Crawley is also being seen today for 6-month follow-up visit and medication refills.    She has been doing well since our last visit together in July.    She did have a flareup with gout in her right great toe and uric acid level did return elevated above 9 in the past.  We did stop her HCTZ at her last visit.  She has continued with Norvasc and olmesartan and had good BP checks off hctz at home.  She does have colchicine to use for flareups as needed.  Repeat uric acid returned significantly improved off HCTZ (down from 9.7 to 7.3). No gout flareups  off HCTZ!      She did have blood work done before today's appointment and it showed ongoing stage IIIa chronic kidney disease related to hypertension and diabetes (improved from last labs when she was in stage IIIb).      She does understand the need to avoid NSAIDs given chronic kidney disease.      She did have a microalbumin test at her Medicare wellness visit in January that returned positive (20) and it is pos again today.  She does continue on her ARB for hypertension which also helps with renal protection.     She continues to work hard on diet and exercise efforts given concerns for prediabetes.     Her last A1c was down to 5.8!.      Blood pressure up today and she does report home BP readings higher since last visit - willing to change from norvasc and benicar to exforge.      Continues with ophthalmology follow-up for glaucoma.  Underwent glaucoma sgy in 2023 and is off drops!  Good diabetic eye checkup Jan 2024.      On B12 and folic acid along with vitamin D given past history of vitamin deficiencies.     Continues atorvastatin for hyperlipidemia.  Her most recent lipid panel done last week showed LDL at 104 but was 64 with labs in July.  Willing for dose increase to 20mg with atorvastatin.      On Synthroid for hypothyroidism with no missed doses.  Recent thyroid studies returned normal with blood work done last week.     She reports she does have an advance directive and will get us a copy for chart update.     DEXA up-to-date for osteoporosis screening January 2023.  It returned normal.  Plan to repeat in January 2025.       Mammogram up-to-date at [facility] 22, 2023.  She plans for yearly mammogram at [facility] in June.         Review of Systems   Constitutional:  Negative for activity change, appetite change, chills, fatigue and fever.   HENT:  Negative for ear pain, hearing loss and trouble swallowing.    Eyes:  Negative for pain and visual disturbance.   Respiratory:   Negative for cough, chest tightness, shortness of breath and wheezing.    Cardiovascular:  Negative for chest pain, palpitations and leg swelling.   Gastrointestinal:  Negative for abdominal pain and blood in stool.   Genitourinary:  Negative for difficulty urinating.   Musculoskeletal:  Negative for arthralgias, back pain and joint swelling.   Skin:  Negative for rash.   Neurological:  Negative for dizziness, weakness and light-headedness.   Psychiatric/Behavioral:  Negative for agitation, behavioral problems, dysphoric mood and sleep disturbance.        Objective   Vital Signs:  /70   Pulse 53   Wt 75.8 kg (167 lb)   SpO2 99%   BMI 27.79 kg/m²     Physical Exam  Constitutional:       Appearance: Normal appearance.   HENT:      Head: Normocephalic.      Right Ear: External ear normal.      Left Ear: External ear normal.      Nose: Nose normal.   Eyes:      Pupils: Pupils are equal, round, and reactive to light.   Cardiovascular:      Rate and Rhythm: Normal rate and regular rhythm.      Heart sounds: Normal heart sounds.   Pulmonary:      Effort: Pulmonary effort is normal.      Breath sounds: Normal breath sounds.   Musculoskeletal:         General: Normal range of motion.      Cervical back: Normal range of motion.   Skin:     General: Skin is warm and dry.   Neurological:      General: No focal deficit present.      Mental Status: She is alert.   Psychiatric:         Mood and Affect: Mood normal.         Behavior: Behavior normal.         Thought Content: Thought content normal.                         Assessment and Plan   Diagnoses and all orders for this visit:    1. General medical exam (Primary)  Assessment & Plan:  Discussed together health maintenance and screening along with vaccination options and healthy diet and exercise habits as part of the preventative counseling at their physical exam today.       2. Hypertension, essential  Assessment & Plan:  Hypertension is worsening.  Weight  loss.  Regular aerobic exercise.  Medication changes per orders.  Blood pressure will be reassessed at the next regular appointment.    Will change from norvasc/benicar to exforge and adjust up norvasc to 10mg in exforge from 7.5mg  Cont home bp checks and notify us if staying over 140/90 .    Orders:  -     amLODIPine-valsartan (Exforge)  MG per tablet; Take 1 tablet by mouth Daily. (Replaces amlodipine and olmesartan)  Dispense: 90 tablet; Refill: 2  -     CBC Auto Differential; Future  -     Comprehensive Metabolic Panel; Future  -     Lipid Panel; Future  -     TSH; Future  -     T4, Free; Future    3. Hyperlipidemia, mixed  Assessment & Plan:  Lipid abnormalities are worsening.  Pharmacotherapy as ordered.  Lipids will be reassessed in 6 months    Reviewed labs - we will go up to 20mg with atorvastatin.    Orders:  -     atorvastatin (LIPITOR) 20 MG tablet; Take 1 tablet by mouth Daily.  Dispense: 90 tablet; Refill: 2  -     CBC Auto Differential; Future  -     Comprehensive Metabolic Panel; Future  -     Lipid Panel; Future  -     TSH; Future  -     T4, Free; Future    4. Hypothyroidism (acquired)  Assessment & Plan:  Continue Synthroid    Orders:  -     levothyroxine (SYNTHROID, LEVOTHROID) 75 MCG tablet; Take 1 tablet by mouth Daily.  Dispense: 90 tablet; Refill: 2  -     CBC Auto Differential; Future  -     Comprehensive Metabolic Panel; Future  -     Lipid Panel; Future  -     TSH; Future  -     T4, Free; Future    5. Stage 3a chronic kidney disease  Assessment & Plan:  Renal condition is improving with treatment.  Regular aerobic exercise.  Medication changes per orders.  Renal condition will be reassessed in 6 months.    Orders:  -     CBC Auto Differential; Future  -     Comprehensive Metabolic Panel; Future  -     Lipid Panel; Future  -     TSH; Future  -     T4, Free; Future    6. Microalbuminuria  Assessment & Plan:  Continue on ARB - changed to exforge today for better BP control      Orders:  -     POCT microalbumin    7. Hyperglycemia  Assessment & Plan:  Continue to work on diet and exercise efforts.  Recent A1c was 5.8.    Orders:  -     POCT microalbumin  -     Hemoglobin A1c; Future    8. Glaucoma of both eyes, unspecified glaucoma type  Assessment & Plan:  Doing well after glaucoma sgy in 2023    No longer on drops      9. Vitamin B12 deficiency  Assessment & Plan:  Continues on B12 with folic acid    Orders:  -     vitamin B-12 (CYANOCOBALAMIN) 1000 MCG tablet; Take 1 tablet by mouth Every Other Day.  -     folic acid (FOLVITE) 1 MG tablet; Take 1 tablet by mouth Daily.  Dispense: 90 tablet; Refill: 2  -     CBC Auto Differential; Future  -     Vitamin B12; Future    10. Vitamin D deficiency  Assessment & Plan:  Continue vitamin D    Orders:  -     cholecalciferol (VITAMIN D3) 25 MCG (1000 UT) tablet; Take 2 tablets by mouth Daily.  -     Vitamin D,25-Hydroxy; Future    11. Acute drug-induced gout involving toe of right foot  Assessment & Plan:  Resolved off hctz      12. Overweight (BMI 25.0-29.9)  Assessment & Plan:  Patient's (Body mass index is 27.79 kg/m².) indicates that they are overweight with health conditions that include hypertension and dyslipidemias . Weight is improving with lifestyle modifications. BMI is is above average; BMI management plan is completed. We discussed portion control and increasing exercise.       13. Folic acid deficiency  -     CBC Auto Differential; Future  -     Folate; Future             Follow Up   Return in about 6 months (around 7/29/2024) for Fasting for labs at appointment (but drink water!).  Patient was given instructions and counseling regarding her condition or for health maintenance advice. Please see specific information pulled into the AVS if appropriate.

## 2024-01-29 NOTE — ASSESSMENT & PLAN NOTE
Hypertension is worsening.  Weight loss.  Regular aerobic exercise.  Medication changes per orders.  Blood pressure will be reassessed at the next regular appointment.    Will change from norvasc/benicar to exforge and adjust up norvasc to 10mg in exforge from 7.5mg  Cont home bp checks and notify us if staying over 140/90 .

## 2024-04-11 DIAGNOSIS — E78.2 HYPERLIPIDEMIA, MIXED: Chronic | ICD-10-CM

## 2024-04-12 RX ORDER — ATORVASTATIN CALCIUM 20 MG/1
20 TABLET, FILM COATED ORAL DAILY
Qty: 90 TABLET | Refills: 2 | Status: SHIPPED | OUTPATIENT
Start: 2024-04-12

## 2024-04-12 RX ORDER — ATORVASTATIN CALCIUM 10 MG/1
10 TABLET, FILM COATED ORAL DAILY
Qty: 90 TABLET | Refills: 0 | OUTPATIENT
Start: 2024-04-12

## 2024-04-12 NOTE — TELEPHONE ENCOUNTER
Her atorvastatin was changed to 20mg and sent in for 6 mos of refills at her visit in January.  That is why the 10mg dose was not resent. But I did refill the 20mg dose again in case she had problems getting that filled from Jan - she should be on the 20mg dose.

## 2024-04-17 DIAGNOSIS — I10 HYPERTENSION, ESSENTIAL: Chronic | ICD-10-CM

## 2024-04-17 RX ORDER — AMLODIPINE BESYLATE 2.5 MG/1
2.5 TABLET ORAL DAILY
Qty: 90 TABLET | Refills: 0 | OUTPATIENT
Start: 2024-04-17

## 2024-06-26 DIAGNOSIS — R92.8 ABNORMALITY OF RIGHT BREAST ON SCREENING MAMMOGRAM: Primary | ICD-10-CM

## 2024-06-27 ENCOUNTER — TELEPHONE (OUTPATIENT)
Dept: FAMILY MEDICINE CLINIC | Facility: CLINIC | Age: 79
End: 2024-06-27
Payer: MEDICARE

## 2024-06-27 NOTE — TELEPHONE ENCOUNTER
HUB TO RELAY    her recent screening mammogram returned with some findings that require additional imaging work-up of the right breast.     This does frequently happen given the increased sensitivity of the new digital and 3D mammogram technology.     We do need to get the follow-up imaging done just to make sure everything looks okay.     I put an order in for the diagnostic mammogram and breast ultrasound to also be done if needed.     She will get a call with this appointment information and we will contact her as soon as the additional imaging results are available.     It is very important for her to get this done.  Please let me know if she has any questions.

## 2024-06-27 NOTE — TELEPHONE ENCOUNTER
Name: Crawley, Cecilia CASTILLO      Relationship: Self      Best Callback Number: [phone]       HUB PROVIDED THE RELAY MESSAGE FROM THE OFFICE      PATIENT: VOICED UNDERSTANDING AND HAS NO FURTHER QUESTIONS AT THIS TIME    ADDITIONAL INFORMATION:

## 2024-06-27 NOTE — TELEPHONE ENCOUNTER
HUB CAN RELAY:     PATIENT IS SCHEDULED AT [facility] ON AUGUST 22, 2024 AT 1:00 PM FOR A DIAGNOSTIC MAMMOGRAM AND ULTRASOUND. IF YOU NEED TO CANCEL OR RESCHEDULE YOU CAN CONTACT [facility]ENTRAL SCHEDULING [phone]. YOU CAN ALSO CALL THIS NUMBER TO CHECK FOR ANY CANCELLATIONS. THANK YOU

## 2024-07-24 ENCOUNTER — LAB (OUTPATIENT)
Dept: FAMILY MEDICINE CLINIC | Facility: CLINIC | Age: 79
End: 2024-07-24
Payer: MEDICARE

## 2024-07-24 DIAGNOSIS — R73.9 HYPERGLYCEMIA: Chronic | ICD-10-CM

## 2024-07-24 DIAGNOSIS — E53.8 VITAMIN B12 DEFICIENCY: Chronic | ICD-10-CM

## 2024-07-24 DIAGNOSIS — E55.9 VITAMIN D DEFICIENCY: Chronic | ICD-10-CM

## 2024-07-24 DIAGNOSIS — E78.2 HYPERLIPIDEMIA, MIXED: Chronic | ICD-10-CM

## 2024-07-24 DIAGNOSIS — E03.9 HYPOTHYROIDISM (ACQUIRED): Chronic | ICD-10-CM

## 2024-07-24 DIAGNOSIS — N18.31 STAGE 3A CHRONIC KIDNEY DISEASE: Chronic | ICD-10-CM

## 2024-07-24 DIAGNOSIS — E53.8 FOLIC ACID DEFICIENCY: ICD-10-CM

## 2024-07-24 DIAGNOSIS — I10 HYPERTENSION, ESSENTIAL: Chronic | ICD-10-CM

## 2024-07-24 PROCEDURE — 36415 COLL VENOUS BLD VENIPUNCTURE: CPT | Performed by: FAMILY MEDICINE

## 2024-07-25 LAB
25(OH)D3+25(OH)D2 SERPL-MCNC: 33.8 NG/ML (ref 30–100)
ALBUMIN SERPL-MCNC: 4.1 G/DL (ref 3.7–4.7)
ALP SERPL-CCNC: 72 IU/L (ref 44–121)
ALT SERPL-CCNC: 15 IU/L (ref 0–32)
AST SERPL-CCNC: 30 IU/L (ref 0–40)
BASOPHILS # BLD AUTO: 0.1 X10E3/UL (ref 0–0.2)
BASOPHILS NFR BLD AUTO: 1 %
BILIRUB SERPL-MCNC: 0.3 MG/DL (ref 0–1.2)
BUN SERPL-MCNC: 29 MG/DL (ref 8–27)
BUN/CREAT SERPL: 20 (ref 12–28)
CALCIUM SERPL-MCNC: 9.7 MG/DL (ref 8.7–10.3)
CHLORIDE SERPL-SCNC: 108 MMOL/L (ref 96–106)
CHOLEST SERPL-MCNC: 159 MG/DL (ref 100–199)
CO2 SERPL-SCNC: 22 MMOL/L (ref 20–29)
CREAT SERPL-MCNC: 1.43 MG/DL (ref 0.57–1)
EGFRCR SERPLBLD CKD-EPI 2021: 37 ML/MIN/1.73
EOSINOPHIL # BLD AUTO: 0.2 X10E3/UL (ref 0–0.4)
EOSINOPHIL NFR BLD AUTO: 2 %
ERYTHROCYTE [DISTWIDTH] IN BLOOD BY AUTOMATED COUNT: 13.8 % (ref 11.7–15.4)
FOLATE SERPL-MCNC: >20 NG/ML
GLOBULIN SER CALC-MCNC: 2.5 G/DL (ref 1.5–4.5)
GLUCOSE SERPL-MCNC: 93 MG/DL (ref 70–99)
HBA1C MFR BLD: 5.8 % (ref 4.8–5.6)
HCT VFR BLD AUTO: 38 % (ref 34–46.6)
HDLC SERPL-MCNC: 59 MG/DL
HGB BLD-MCNC: 12.3 G/DL (ref 11.1–15.9)
IMM GRANULOCYTES # BLD AUTO: 0 X10E3/UL (ref 0–0.1)
IMM GRANULOCYTES NFR BLD AUTO: 0 %
LDLC SERPL CALC-MCNC: 77 MG/DL (ref 0–99)
LYMPHOCYTES # BLD AUTO: 2.4 X10E3/UL (ref 0.7–3.1)
LYMPHOCYTES NFR BLD AUTO: 32 %
MCH RBC QN AUTO: 30.8 PG (ref 26.6–33)
MCHC RBC AUTO-ENTMCNC: 32.4 G/DL (ref 31.5–35.7)
MCV RBC AUTO: 95 FL (ref 79–97)
MONOCYTES # BLD AUTO: 0.8 X10E3/UL (ref 0.1–0.9)
MONOCYTES NFR BLD AUTO: 11 %
NEUTROPHILS # BLD AUTO: 4 X10E3/UL (ref 1.4–7)
NEUTROPHILS NFR BLD AUTO: 54 %
PLATELET # BLD AUTO: 188 X10E3/UL (ref 150–450)
POTASSIUM SERPL-SCNC: 4.6 MMOL/L (ref 3.5–5.2)
PROT SERPL-MCNC: 6.6 G/DL (ref 6–8.5)
RBC # BLD AUTO: 4 X10E6/UL (ref 3.77–5.28)
SODIUM SERPL-SCNC: 144 MMOL/L (ref 134–144)
T4 FREE SERPL-MCNC: 1.26 NG/DL (ref 0.82–1.77)
TRIGL SERPL-MCNC: 131 MG/DL (ref 0–149)
TSH SERPL DL<=0.005 MIU/L-ACNC: 2.25 UIU/ML (ref 0.45–4.5)
VIT B12 SERPL-MCNC: 619 PG/ML (ref 232–1245)
VLDLC SERPL CALC-MCNC: 23 MG/DL (ref 5–40)
WBC # BLD AUTO: 7.4 X10E3/UL (ref 3.4–10.8)

## 2024-07-31 ENCOUNTER — OFFICE VISIT (OUTPATIENT)
Dept: FAMILY MEDICINE CLINIC | Facility: CLINIC | Age: 79
End: 2024-07-31
Payer: MEDICARE

## 2024-07-31 VITALS
OXYGEN SATURATION: 96 % | HEART RATE: 52 BPM | HEIGHT: 65 IN | BODY MASS INDEX: 27.82 KG/M2 | WEIGHT: 167 LBS | SYSTOLIC BLOOD PRESSURE: 158 MMHG | DIASTOLIC BLOOD PRESSURE: 62 MMHG

## 2024-07-31 DIAGNOSIS — E66.3 OVERWEIGHT (BMI 25.0-29.9): ICD-10-CM

## 2024-07-31 DIAGNOSIS — E55.9 VITAMIN D DEFICIENCY: Chronic | ICD-10-CM

## 2024-07-31 DIAGNOSIS — R73.9 HYPERGLYCEMIA: Chronic | ICD-10-CM

## 2024-07-31 DIAGNOSIS — E53.8 VITAMIN B12 DEFICIENCY: Chronic | ICD-10-CM

## 2024-07-31 DIAGNOSIS — N18.32 STAGE 3B CHRONIC KIDNEY DISEASE: ICD-10-CM

## 2024-07-31 DIAGNOSIS — E03.9 HYPOTHYROIDISM (ACQUIRED): Chronic | ICD-10-CM

## 2024-07-31 DIAGNOSIS — I10 HYPERTENSION, ESSENTIAL: Primary | Chronic | ICD-10-CM

## 2024-07-31 DIAGNOSIS — E78.2 HYPERLIPIDEMIA, MIXED: Chronic | ICD-10-CM

## 2024-07-31 DIAGNOSIS — R80.9 MICROALBUMINURIA: Chronic | ICD-10-CM

## 2024-07-31 PROCEDURE — G2211 COMPLEX E/M VISIT ADD ON: HCPCS | Performed by: FAMILY MEDICINE

## 2024-07-31 PROCEDURE — 3078F DIAST BP <80 MM HG: CPT | Performed by: FAMILY MEDICINE

## 2024-07-31 PROCEDURE — 99214 OFFICE O/P EST MOD 30 MIN: CPT | Performed by: FAMILY MEDICINE

## 2024-07-31 PROCEDURE — 1126F AMNT PAIN NOTED NONE PRSNT: CPT | Performed by: FAMILY MEDICINE

## 2024-07-31 PROCEDURE — 3077F SYST BP >= 140 MM HG: CPT | Performed by: FAMILY MEDICINE

## 2024-07-31 PROCEDURE — 1160F RVW MEDS BY RX/DR IN RCRD: CPT | Performed by: FAMILY MEDICINE

## 2024-07-31 PROCEDURE — 1159F MED LIST DOCD IN RCRD: CPT | Performed by: FAMILY MEDICINE

## 2024-07-31 RX ORDER — FOLIC ACID 1 MG/1
1 TABLET ORAL DAILY
Qty: 90 TABLET | Refills: 2 | Status: SHIPPED | OUTPATIENT
Start: 2024-07-31

## 2024-07-31 RX ORDER — LEVOTHYROXINE SODIUM 0.07 MG/1
75 TABLET ORAL DAILY
Qty: 90 TABLET | Refills: 2 | Status: SHIPPED | OUTPATIENT
Start: 2024-07-31

## 2024-07-31 RX ORDER — LANOLIN ALCOHOL/MO/W.PET/CERES
1000 CREAM (GRAM) TOPICAL EVERY OTHER DAY
Start: 2024-07-31

## 2024-07-31 RX ORDER — AMLODIPINE AND VALSARTAN 10; 320 MG/1; MG/1
1 TABLET ORAL DAILY
Qty: 90 TABLET | Refills: 2 | Status: SHIPPED | OUTPATIENT
Start: 2024-07-31

## 2024-07-31 RX ORDER — ATORVASTATIN CALCIUM 20 MG/1
20 TABLET, FILM COATED ORAL DAILY
Qty: 90 TABLET | Refills: 2 | Status: SHIPPED | OUTPATIENT
Start: 2024-07-31

## 2024-07-31 RX ORDER — HYDRALAZINE HYDROCHLORIDE 25 MG/1
25 TABLET, FILM COATED ORAL 3 TIMES DAILY
Qty: 90 TABLET | Refills: 1 | Status: SHIPPED | OUTPATIENT
Start: 2024-07-31

## 2024-07-31 RX ORDER — CHOLECALCIFEROL (VITAMIN D3) 25 MCG
2000 TABLET ORAL DAILY
Start: 2024-07-31

## 2024-07-31 NOTE — ASSESSMENT & PLAN NOTE
Discussed BP elevation here and home readings    We will add hydralazine given her baseline bradycardia and CKD.  Cont exforge.  Recheck in 4-6 weeks or sooner if problems arise

## 2024-08-22 DIAGNOSIS — R92.8 ABNORMALITY OF RIGHT BREAST ON SCREENING MAMMOGRAM: Primary | ICD-10-CM

## 2024-08-26 DIAGNOSIS — R92.8 ABNORMALITY OF RIGHT BREAST ON SCREENING MAMMOGRAM: Primary | ICD-10-CM

## 2024-08-30 ENCOUNTER — OFFICE VISIT (OUTPATIENT)
Dept: FAMILY MEDICINE CLINIC | Facility: CLINIC | Age: 79
End: 2024-08-30
Payer: MEDICARE

## 2024-08-30 VITALS
SYSTOLIC BLOOD PRESSURE: 152 MMHG | HEART RATE: 51 BPM | WEIGHT: 167.2 LBS | BODY MASS INDEX: 27.86 KG/M2 | DIASTOLIC BLOOD PRESSURE: 68 MMHG | HEIGHT: 65 IN | OXYGEN SATURATION: 98 %

## 2024-08-30 DIAGNOSIS — E03.9 HYPOTHYROIDISM (ACQUIRED): Chronic | ICD-10-CM

## 2024-08-30 DIAGNOSIS — R92.8 ABNORMALITY OF RIGHT BREAST ON SCREENING MAMMOGRAM: ICD-10-CM

## 2024-08-30 DIAGNOSIS — R80.9 MICROALBUMINURIA: Chronic | ICD-10-CM

## 2024-08-30 DIAGNOSIS — R73.9 HYPERGLYCEMIA: Chronic | ICD-10-CM

## 2024-08-30 DIAGNOSIS — E78.2 HYPERLIPIDEMIA, MIXED: Chronic | ICD-10-CM

## 2024-08-30 DIAGNOSIS — E66.3 OVERWEIGHT (BMI 25.0-29.9): ICD-10-CM

## 2024-08-30 DIAGNOSIS — I10 HYPERTENSION, ESSENTIAL: Primary | Chronic | ICD-10-CM

## 2024-08-30 DIAGNOSIS — N18.32 STAGE 3B CHRONIC KIDNEY DISEASE: ICD-10-CM

## 2024-08-30 PROCEDURE — G2211 COMPLEX E/M VISIT ADD ON: HCPCS | Performed by: FAMILY MEDICINE

## 2024-08-30 PROCEDURE — 1126F AMNT PAIN NOTED NONE PRSNT: CPT | Performed by: FAMILY MEDICINE

## 2024-08-30 PROCEDURE — 99214 OFFICE O/P EST MOD 30 MIN: CPT | Performed by: FAMILY MEDICINE

## 2024-08-30 PROCEDURE — 1159F MED LIST DOCD IN RCRD: CPT | Performed by: FAMILY MEDICINE

## 2024-08-30 PROCEDURE — 3077F SYST BP >= 140 MM HG: CPT | Performed by: FAMILY MEDICINE

## 2024-08-30 PROCEDURE — 3078F DIAST BP <80 MM HG: CPT | Performed by: FAMILY MEDICINE

## 2024-08-30 PROCEDURE — 1160F RVW MEDS BY RX/DR IN RCRD: CPT | Performed by: FAMILY MEDICINE

## 2024-08-30 RX ORDER — HYDRALAZINE HYDROCHLORIDE 50 MG/1
50 TABLET, FILM COATED ORAL 3 TIMES DAILY
Qty: 90 TABLET | Refills: 3 | Status: SHIPPED | OUTPATIENT
Start: 2024-08-30

## 2024-08-30 NOTE — ASSESSMENT & PLAN NOTE
Patient's (There is no height or weight on file to calculate BMI.) indicates that they are overweight with health conditions that include hypertension and dyslipidemias . Weight is improving with lifestyle modifications. BMI is is above average; BMI management plan is completed. We discussed portion control and increasing exercise.

## 2024-08-30 NOTE — PROGRESS NOTES
Chief Complaint  Hypertension followup after adding hydralazine.  Recheck on Hyperlipidemia, Hypothyroidism, Hyperglycemia    Subjective    History of Present Illness:  Cecilia Crawley is a 79 y.o. female who presents today for follow-up visit and medication refills.    Doing well since our visit together in July.  We did add hydralazine given uncontrolled BP on exforge and past problems with bradycardia with b-blockers and CKD-IIIb.    BP is still up but no side-effects with low-dose hydralazine.  Willing for dose adjustment to 50mg TID - discussed options for cardiology referral and she will consider in the future if still unable to get BP better controlled with hydralazine adjustment.     She did have a flareup with gout in her right great toe and uric acid level did return elevated above 9 in the past.  We did stop her HCTZ.  She does have colchicine to use for flareups as needed.  Repeat uric acid returned significantly improved off HCTZ (down from 9.7 to 7.3). No gout flareups off HCTZ!      Last labs in July 2024 returned with ongoing stage IIIb chronic kidney disease related to hypertension and diabetes.      She does understand the need to avoid NSAIDs given chronic kidney disease.      She did have a microalbumin test at her Medicare wellness visit in January 2024 that returned positive.  She does continue on her ARB for hypertension which also helps with renal protection.     She continues to work hard on diet and exercise efforts given concerns for prediabetes.     Her last A1c was stable at 5.8!.       Continues with ophthalmology follow-up for glaucoma.  Underwent glaucoma sgy in 2023 and is off drops!  Good diabetic eye checkup Jan 2024.      On B12 and folic acid along with vitamin D given past history of vitamin deficiencies.     Continues atorvastatin for hyperlipidemia.  Her most recent lipid panel done showed LDL at 77.     On Synthroid for hypothyroidism with no missed doses.  Recent thyroid  "studies returned normal with blood work done July 2024     She reports she does have an advance directive and will get us a copy for chart update.     DEXA up-to-date for osteoporosis screening January 2023.  It returned normal.  Plan to repeat in January 2025.       Mammogram up-to-date at [facility] June 24, 2024 with need for followup imaging of the R breast - this was completed on 8/22/24 with no findings concerning for breast cancer and plan to recheck a R breast diagnostic mammogram and ultrasound in 6 mos (Feb 2025) - this has been ordered and scheduled.     Objective   Vital Signs:   /68   Pulse 51   Ht 165.1 cm (65\")   Wt 75.8 kg (167 lb 3.2 oz)   SpO2 98%   BMI 27.82 kg/m²     Review of Systems   Constitutional:  Negative for appetite change, chills and fever.   HENT:  Negative for hearing loss.    Eyes:  Negative for blurred vision.   Respiratory:  Negative for chest tightness.    Cardiovascular:  Negative for chest pain.   Gastrointestinal:  Negative for abdominal pain.   Musculoskeletal:  Negative for gait problem.   Skin:  Negative for rash.   Psychiatric/Behavioral:  Negative for depressed mood.        Past History:  Medical History: has a past medical history of Hypertension.   Surgical History: has a past surgical history that includes Cataract extraction and Eye surgery.   Family History: family history is not on file.   Social History: reports that she quit smoking about 26 years ago. Her smoking use included cigarettes. She started smoking about 66 years ago. She has a 20 pack-year smoking history. She has never used smokeless tobacco. She reports that she does not currently use alcohol. She reports that she does not use drugs.      Current Outpatient Medications:     amLODIPine-valsartan (Exforge)  MG per tablet, Take 1 tablet by mouth Daily., Disp: 90 tablet, Rfl: 2    atorvastatin (LIPITOR) 20 MG tablet, Take 1 tablet by mouth Daily., Disp: 90 tablet, " Rfl: 2    cholecalciferol (VITAMIN D3) 25 MCG (1000 UT) tablet, Take 2 tablets by mouth Daily., Disp: , Rfl:     colchicine 0.6 MG tablet, Take 1 tablet by mouth 2 (Two) Times a Day As Needed for Muscle / Joint Pain (gout flareup)., Disp: 30 tablet, Rfl: 3    folic acid (FOLVITE) 1 MG tablet, Take 1 tablet by mouth Daily., Disp: 90 tablet, Rfl: 2    hydrALAZINE (APRESOLINE) 50 MG tablet, Take 1 tablet by mouth 3 (Three) Times a Day. FOR BLOOD PRESSURE, Disp: 90 tablet, Rfl: 3    levothyroxine (SYNTHROID, LEVOTHROID) 75 MCG tablet, Take 1 tablet by mouth Daily., Disp: 90 tablet, Rfl: 2    polyethyl glycol-propyl glycol (SYSTANE) 0.4-0.3 % solution ophthalmic solution (artificial tears), Administer 2 drops to both eyes Every 1 (One) Hour As Needed., Disp: , Rfl:     vitamin B-12 (CYANOCOBALAMIN) 1000 MCG tablet, Take 1 tablet by mouth Every Other Day., Disp: , Rfl:     Allergies: Beta adrenergic blockers, Hydrochlorothiazide, Prazosin, and Sulfa antibiotics    Physical Exam  Constitutional:       Appearance: Normal appearance.   HENT:      Head: Normocephalic.      Right Ear: External ear normal.      Left Ear: External ear normal.      Nose: Nose normal.   Eyes:      Pupils: Pupils are equal, round, and reactive to light.   Cardiovascular:      Rate and Rhythm: Normal rate and regular rhythm.      Heart sounds: Normal heart sounds.   Pulmonary:      Effort: Pulmonary effort is normal.      Breath sounds: Normal breath sounds.   Musculoskeletal:         General: Normal range of motion.      Cervical back: Normal range of motion.   Skin:     General: Skin is warm and dry.   Neurological:      General: No focal deficit present.      Mental Status: She is alert.   Psychiatric:         Mood and Affect: Mood normal.         Behavior: Behavior normal.         Thought Content: Thought content normal.          Result Review                   Assessment and Plan  Diagnoses and all orders for this visit:    1. Hypertension,  essential (Primary)  Assessment & Plan:  Titrated to 50mg TID with hydralazine    Cont Exforge but she will change this to bedtime given some side-effects with taking it at the same time as her hydralazine    Recheck in 1 month or sooner if problems arise.     Orders:  -     hydrALAZINE (APRESOLINE) 50 MG tablet; Take 1 tablet by mouth 3 (Three) Times a Day. FOR BLOOD PRESSURE  Dispense: 90 tablet; Refill: 3    2. Hyperlipidemia, mixed  Assessment & Plan:   Lipid abnormalities are improving with treatment    Plan:  Continue same medication/s without change.      Discussed medication dosage, use, side effects, and goals of treatment in detail.    Counseled patient on lifestyle modifications to help control hyperlipidemia.     Patient Treatment Goals:   LDL goal is under 100    Followup at the next regular appointment.      3. Hyperglycemia  Assessment & Plan:  Continue to work on diet and exercise efforts.  Recent A1c was 5.8.      4. Hypothyroidism (acquired)  Assessment & Plan:  Continue Synthroid      5. Microalbuminuria  Assessment & Plan:  Continue on ARB       6. Stage 3b chronic kidney disease  Assessment & Plan:  Working on better BP control    Will cont to monitor and discussed nephrology eval if worsening      7. Abnormality of right breast on screening mammogram  Assessment & Plan:  Mammogram up-to-date at [facility] June 24, 2024 with need for followup imaging of the R breast - this was completed on 8/22/24 with no findings concerning for breast cancer and plan to recheck a R breast diagnostic mammogram and ultrasound in 6 mos (Feb 2025) - this has been ordered and scheduled.       8. Overweight (BMI 25.0-29.9)  Assessment & Plan:  Patient's (There is no height or weight on file to calculate BMI.) indicates that they are overweight with health conditions that include hypertension and dyslipidemias . Weight is improving with lifestyle modifications. BMI is is above average; BMI  management plan is completed. We discussed portion control and increasing exercise.                      Follow Up  Return in about 25 days (around 9/24/2024) for Med recheck.    Kalyan Rocha MD

## 2024-08-30 NOTE — ASSESSMENT & PLAN NOTE
Titrated to 50mg TID with hydralazine    Cont Exforge but she will change this to bedtime given some side-effects with taking it at the same time as her hydralazine    Recheck in 1 month or sooner if problems arise.

## 2024-08-30 NOTE — ASSESSMENT & PLAN NOTE
Mammogram up-to-date at [facility] June 24, 2024 with need for followup imaging of the R breast - this was completed on 8/22/24 with no findings concerning for breast cancer and plan to recheck a R breast diagnostic mammogram and ultrasound in 6 mos (Feb 2025) - this has been ordered and scheduled.

## 2024-09-24 ENCOUNTER — OFFICE VISIT (OUTPATIENT)
Dept: FAMILY MEDICINE CLINIC | Facility: CLINIC | Age: 79
End: 2024-09-24
Payer: MEDICARE

## 2024-09-24 VITALS
BODY MASS INDEX: 28.49 KG/M2 | HEART RATE: 62 BPM | OXYGEN SATURATION: 97 % | HEIGHT: 65 IN | WEIGHT: 171 LBS | SYSTOLIC BLOOD PRESSURE: 148 MMHG | DIASTOLIC BLOOD PRESSURE: 60 MMHG

## 2024-09-24 DIAGNOSIS — R07.89 ATYPICAL CHEST PAIN: ICD-10-CM

## 2024-09-24 DIAGNOSIS — N18.32 STAGE 3B CHRONIC KIDNEY DISEASE: Chronic | ICD-10-CM

## 2024-09-24 DIAGNOSIS — M10.271 ACUTE DRUG-INDUCED GOUT INVOLVING TOE OF RIGHT FOOT: ICD-10-CM

## 2024-09-24 DIAGNOSIS — I1A.0 RESISTANT HYPERTENSION: Primary | ICD-10-CM

## 2024-09-24 DIAGNOSIS — E66.3 OVERWEIGHT (BMI 25.0-29.9): ICD-10-CM

## 2024-09-24 PROCEDURE — 1159F MED LIST DOCD IN RCRD: CPT | Performed by: FAMILY MEDICINE

## 2024-09-24 PROCEDURE — 3077F SYST BP >= 140 MM HG: CPT | Performed by: FAMILY MEDICINE

## 2024-09-24 PROCEDURE — 1126F AMNT PAIN NOTED NONE PRSNT: CPT | Performed by: FAMILY MEDICINE

## 2024-09-24 PROCEDURE — 99214 OFFICE O/P EST MOD 30 MIN: CPT | Performed by: FAMILY MEDICINE

## 2024-09-24 PROCEDURE — 1160F RVW MEDS BY RX/DR IN RCRD: CPT | Performed by: FAMILY MEDICINE

## 2024-09-24 PROCEDURE — 3078F DIAST BP <80 MM HG: CPT | Performed by: FAMILY MEDICINE

## 2024-09-24 PROCEDURE — G2211 COMPLEX E/M VISIT ADD ON: HCPCS | Performed by: FAMILY MEDICINE

## 2024-09-27 ENCOUNTER — TELEPHONE (OUTPATIENT)
Dept: CARDIOLOGY | Facility: CLINIC | Age: 79
End: 2024-09-27

## 2024-09-27 ENCOUNTER — OFFICE VISIT (OUTPATIENT)
Dept: CARDIOLOGY | Facility: CLINIC | Age: 79
End: 2024-09-27
Payer: MEDICARE

## 2024-09-27 VITALS
WEIGHT: 169.2 LBS | HEIGHT: 65 IN | BODY MASS INDEX: 28.19 KG/M2 | SYSTOLIC BLOOD PRESSURE: 132 MMHG | RESPIRATION RATE: 16 BRPM | OXYGEN SATURATION: 96 % | DIASTOLIC BLOOD PRESSURE: 64 MMHG | HEART RATE: 64 BPM

## 2024-09-27 DIAGNOSIS — E78.2 HYPERLIPIDEMIA, MIXED: Chronic | ICD-10-CM

## 2024-09-27 DIAGNOSIS — I1A.0 RESISTANT HYPERTENSION: Primary | ICD-10-CM

## 2024-09-27 DIAGNOSIS — R07.89 ATYPICAL CHEST PAIN: ICD-10-CM

## 2024-09-27 PROCEDURE — 99204 OFFICE O/P NEW MOD 45 MIN: CPT | Performed by: INTERNAL MEDICINE

## 2024-09-27 PROCEDURE — 1159F MED LIST DOCD IN RCRD: CPT | Performed by: INTERNAL MEDICINE

## 2024-09-27 PROCEDURE — 1160F RVW MEDS BY RX/DR IN RCRD: CPT | Performed by: INTERNAL MEDICINE

## 2024-09-27 PROCEDURE — 93000 ELECTROCARDIOGRAM COMPLETE: CPT | Performed by: INTERNAL MEDICINE

## 2024-09-27 PROCEDURE — 3075F SYST BP GE 130 - 139MM HG: CPT | Performed by: INTERNAL MEDICINE

## 2024-09-27 PROCEDURE — 3078F DIAST BP <80 MM HG: CPT | Performed by: INTERNAL MEDICINE

## 2024-09-27 RX ORDER — SPIRONOLACTONE 25 MG/1
12.5 TABLET ORAL DAILY
Qty: 90 TABLET | Refills: 3 | Status: SHIPPED | OUTPATIENT
Start: 2024-09-27

## 2024-10-11 ENCOUNTER — CLINICAL SUPPORT (OUTPATIENT)
Dept: CARDIOLOGY | Facility: CLINIC | Age: 79
End: 2024-10-11
Payer: MEDICARE

## 2024-10-11 ENCOUNTER — TELEPHONE (OUTPATIENT)
Dept: CARDIOLOGY | Facility: CLINIC | Age: 79
End: 2024-10-11
Payer: MEDICARE

## 2024-10-11 VITALS — SYSTOLIC BLOOD PRESSURE: 150 MMHG | DIASTOLIC BLOOD PRESSURE: 68 MMHG

## 2024-10-11 DIAGNOSIS — R07.89 ATYPICAL CHEST PAIN: ICD-10-CM

## 2024-10-11 DIAGNOSIS — I10 HYPERTENSION, ESSENTIAL: Chronic | ICD-10-CM

## 2024-10-11 DIAGNOSIS — E78.2 HYPERLIPIDEMIA, MIXED: ICD-10-CM

## 2024-10-11 DIAGNOSIS — I1A.0 RESISTANT HYPERTENSION: Primary | ICD-10-CM

## 2024-10-11 NOTE — PROGRESS NOTES
Venipuncture Blood Specimen Collection  Venipuncture performed in clinic by Kaykay Marmolejo MA with good hemostasis. Patient tolerated the procedure well without complications.   10/11/24   Kaykay Marmolejo MA

## 2024-10-11 NOTE — TELEPHONE ENCOUNTER
Pt came into office for blood work and blood pressure check.  Blood pressure at home has been   9/27 /66 /76  9/28 /68   9/29 /71  9/30 /76  10/2 143/65  10/7 /71  10/10 /74  10/11 /68    Today in office 150/68. Pt denies any headache, sob, or chest pain. Please advise?

## 2024-10-12 LAB
BUN SERPL-MCNC: 28 MG/DL (ref 8–27)
BUN/CREAT SERPL: 18 (ref 12–28)
CALCIUM SERPL-MCNC: 9.4 MG/DL (ref 8.7–10.3)
CHLORIDE SERPL-SCNC: 109 MMOL/L (ref 96–106)
CO2 SERPL-SCNC: 21 MMOL/L (ref 20–29)
CREAT SERPL-MCNC: 1.54 MG/DL (ref 0.57–1)
EGFRCR SERPLBLD CKD-EPI 2021: 34 ML/MIN/1.73
GLUCOSE SERPL-MCNC: 90 MG/DL (ref 70–99)
POTASSIUM SERPL-SCNC: 4.6 MMOL/L (ref 3.5–5.2)
SODIUM SERPL-SCNC: 143 MMOL/L (ref 134–144)

## 2024-10-14 ENCOUNTER — TELEPHONE (OUTPATIENT)
Dept: CARDIOLOGY | Facility: CLINIC | Age: 79
End: 2024-10-14
Payer: MEDICARE

## 2024-10-14 RX ORDER — HYDRALAZINE HYDROCHLORIDE 50 MG/1
100 TABLET, FILM COATED ORAL 3 TIMES DAILY
Qty: 90 TABLET | Refills: 3 | Status: SHIPPED | OUTPATIENT
Start: 2024-10-14

## 2024-10-14 NOTE — TELEPHONE ENCOUNTER
----- Message from Kris Parra sent at 10/14/2024 11:15 AM EDT -----  Please inform patient that her Blood work was not significantly changed with the new medication.    Thank you!

## 2024-10-14 NOTE — TELEPHONE ENCOUNTER
----- Message from Kris Parra sent at 10/13/2024  3:24 PM EDT -----  Please inform patient that her transthoracic echocardiogram showed normal cardiac function, mild thickening of the walls of the heart and abnormal stiffening indices with increased cardiac pressures. Moderate leakage of the mitral valve connecting between the left upper and lower chamber. Aggressive medical therapy.   Thank you!

## 2024-10-14 NOTE — TELEPHONE ENCOUNTER
Please ask patient to increase hydralazine 50 mg to 2 tabs 3 times daily, and to continue to monitor her blood pressure.  Patient to check back in in 1 month with her home readings, sooner if readings more than 150/90 or less than 110/60 mmHg.  Thank you!

## 2024-10-14 NOTE — TELEPHONE ENCOUNTER
Medication change discussed with patient, patient verbalized understanding. Will come in x 1 month for BP check and bring BP log

## 2024-10-18 ENCOUNTER — OFFICE VISIT (OUTPATIENT)
Dept: CARDIOLOGY | Facility: CLINIC | Age: 79
End: 2024-10-18
Payer: MEDICARE

## 2024-10-18 VITALS
OXYGEN SATURATION: 98 % | HEIGHT: 65 IN | DIASTOLIC BLOOD PRESSURE: 58 MMHG | WEIGHT: 169 LBS | HEART RATE: 67 BPM | RESPIRATION RATE: 18 BRPM | SYSTOLIC BLOOD PRESSURE: 134 MMHG | BODY MASS INDEX: 28.16 KG/M2

## 2024-10-18 DIAGNOSIS — I1A.0 RESISTANT HYPERTENSION: Primary | ICD-10-CM

## 2024-10-18 DIAGNOSIS — R07.89 ATYPICAL CHEST PAIN: ICD-10-CM

## 2024-10-18 DIAGNOSIS — E78.2 HYPERLIPIDEMIA, MIXED: ICD-10-CM

## 2024-10-18 PROCEDURE — 99214 OFFICE O/P EST MOD 30 MIN: CPT | Performed by: INTERNAL MEDICINE

## 2024-10-18 PROCEDURE — 3075F SYST BP GE 130 - 139MM HG: CPT | Performed by: INTERNAL MEDICINE

## 2024-10-18 PROCEDURE — 1159F MED LIST DOCD IN RCRD: CPT | Performed by: INTERNAL MEDICINE

## 2024-10-18 PROCEDURE — 3078F DIAST BP <80 MM HG: CPT | Performed by: INTERNAL MEDICINE

## 2024-10-18 PROCEDURE — 1160F RVW MEDS BY RX/DR IN RCRD: CPT | Performed by: INTERNAL MEDICINE

## 2024-10-18 RX ORDER — ISOSORBIDE MONONITRATE 30 MG/1
30 TABLET, EXTENDED RELEASE ORAL DAILY
Qty: 90 TABLET | Refills: 3 | Status: SHIPPED | OUTPATIENT
Start: 2024-10-18

## 2024-10-18 RX ORDER — HYDRALAZINE HYDROCHLORIDE 50 MG/1
50 TABLET, FILM COATED ORAL 3 TIMES DAILY
Qty: 90 TABLET | Refills: 3 | Status: SHIPPED | OUTPATIENT
Start: 2024-10-18

## 2024-10-18 NOTE — ASSESSMENT & PLAN NOTE
Lipid abnormalities are improving with treatment.     Plan:  Continue same medication/s without change.  Atorvastatin 20 mg p.o. daily     Discussed medication dosage, use, side effects, and goals of treatment in detail.    Counseled patient on lifestyle modifications to help control hyperlipidemia.     Lab Results   Component Value Date    LDL 77 07/24/2024     (H) 01/22/2024    LDL 64 07/19/2023    HDL 59 07/24/2024    HDL 55 01/22/2024    HDL 51 07/19/2023    CHLPL 159 07/24/2024    CHLPL 197 01/22/2024    CHLPL 140 07/19/2023    TRIG 131 07/24/2024    TRIG 220 (H) 01/22/2024    TRIG 142 07/19/2023     Patient Treatment Goals:   LDL goal is under 100 due to age and primary prevention    Followup at the next regular appointment.

## 2024-10-18 NOTE — ASSESSMENT & PLAN NOTE
Hypertension is borderline controlled, goal blood pressure less than 130/80 systolic due to baseline CKD.  Decrease hydralazine to 50 mg p.o. 3 times daily due to side effects.  Start isosorbide mononitrate 30 mg p.o. daily for blood pressure control.  Continue spironolactone 12.5 mg p.o. daily for now and recheck labs in 3 weeks.  If creatinine still trending up, plan to discontinue spironolactone and label as allergy.  Dietary sodium restriction.  Regular aerobic exercise.  Ambulatory blood pressure monitoring.  Patient to bring her machine with her next visit for calibration.  Blood pressure will be reassessedin 3 weeks.

## 2024-10-18 NOTE — ASSESSMENT & PLAN NOTE
No recurrence.  Past pains worsened by lifting heavy objects objects/above shoulder level. No exertional component. Exam normal. EKG normal. Patient can have pain without arm movement.  Echocardiogram suggesting heart failure preserved ejection fraction, with normal EF, moderate LAE, moderate MR, grade 1 diastolic dysfunction with high filling pressures, moderate pulmonary hypertension. Plan:  Aggressive blood pressure control  Plan to check a proBNP next blood draw in 3 weeks and optimize CHF specific therapy  Patient counseled in regards to heart healthy, low fat/ low cholesterol/low sat diet, daily exercise for 30 minutes, low to moderate intensity, and weight loss.

## 2024-10-18 NOTE — PROGRESS NOTES
[facility] [place]  [facility]  [address] [place] [address]  Dept: [phone]  Dept Fax: [phone]    Date: 10/18/2024  Patient: Cecilia Crawley  YOB: 1945    Follow Up Office Visit Note    Interval Follow-up  Ms. Cecilia Crawley is a 79 y.o. female who is here for follow-up on Chest Pain and Hypertension.    Subjective   Patient not doing so good with hydralazine increase to 2 tabs of 50 mg 3 times daily, with fatigue and lack of energy.  Patient denies angina, orthopnea, PND, palpitations, lightheadedness, syncope or medications side-effects.  BP readings at home systolic 150-170 mmHg, diastolic 70-85 mmHg.    The following portions of the patient's history were reviewed and updated as appropriate: allergies, current medications, past family history, past medical history, past social history, past surgical history, and problem list.    Medications:   Allergies   Allergen Reactions    Beta Adrenergic Blockers Arrhythmia    Hydrochlorothiazide Other (See Comments)     gout    Prazosin Myalgia    Sulfa Antibiotics Itching      Current Outpatient Medications   Medication Instructions    amLODIPine-valsartan (Exforge)  MG per tablet 1 tablet, Oral, Daily    atorvastatin (LIPITOR) 20 mg, Oral, Daily    cholecalciferol (VITAMIN D3) 2,000 Units, Oral, Daily    colchicine 0.6 mg, Oral, 2 Times Daily PRN    folic acid (FOLVITE) 1 mg, Oral, Daily    hydrALAZINE (APRESOLINE) 50 mg, Oral, 3 Times Daily, FOR BLOOD PRESSURE    isosorbide mononitrate (IMDUR) 30 mg, Oral, Daily    levothyroxine (SYNTHROID, LEVOTHROID) 75 mcg, Oral, Daily    polyethyl glycol-propyl glycol (SYSTANE) 0.4-0.3 % solution ophthalmic solution (artificial tears) 2 drops, Both Eyes, Every 1 Hour PRN    spironolactone (ALDACTONE) 12.5 mg, Oral, Daily    vitamin B-12 (CYANOCOBALAMIN) 1,000 mcg, Oral, Every Other Day       Tobacco Use: Medium Risk (10/18/2024)    Patient History     Smoking  "Tobacco Use: Former     Smokeless Tobacco Use: Never     Passive Exposure: Not on file        Objective  Vitals:    10/18/24 0959 10/18/24 1015 10/18/24 1016   BP: 160/56 136/52 134/58   BP Location:  Left arm Right arm   Patient Position:  Sitting Sitting   Cuff Size:  Adult Adult   Pulse: 67     Resp: 18     SpO2: 98%     Weight: 76.7 kg (169 lb)     Height: 165.1 cm (65\")     PainSc: 0-No pain        Vitals:    10/18/24 0959 10/18/24 1015 10/18/24 1016   BP: 160/56 136/52 134/58   BP Location:  Left arm Right arm   Patient Position:  Sitting Sitting   Cuff Size:  Adult Adult   Pulse: 67     Resp: 18     SpO2: 98%     Weight: 76.7 kg (169 lb)     Height: 165.1 cm (65\")            Physical Exam  Constitutional:       Appearance: Not in distress.   Neck:      Vascular: JVD normal.   Pulmonary:      Breath sounds: Normal breath sounds.   Cardiovascular:      Normal rate. Regular rhythm.      Murmurs: There is no murmur.      Comments: Poor lower extremity circulation  Pulses:     Intact distal pulses.   Edema:     Peripheral edema absent.   Neurological:      Mental Status: Alert.              Diagnostic Data  Lab Results   Component Value Date     10/11/2024    K 4.6 10/11/2024     (H) 10/11/2024    CO2 21 10/11/2024    BUN 28 (H) 10/11/2024    CREATININE 1.54 (H) 10/11/2024    CALCIUM 9.4 10/11/2024    BILITOT 0.3 07/24/2024    ALKPHOS 72 07/24/2024    ALT 15 07/24/2024    AST 30 07/24/2024    GLUCOSE 90 10/11/2024    ALBUMIN 4.1 07/24/2024     Lab Results   Component Value Date    WBC 7.4 07/24/2024    HGB 12.3 07/24/2024    HCT 38.0 07/24/2024     07/24/2024     No results found for: \"APTT\", \"INR\", \"PTT\"  No results found for: \"CKTOTAL\", \"TROPONINI\", \"TROPONINT\", \"CKMBINDEX\", \"BNP\"  No results found for: \"BNP\", \"PROBNP\"    Lab Results   Component Value Date    CHLPL 159 07/24/2024    TRIG 131 07/24/2024    HDL 59 07/24/2024    LDL 77 07/24/2024     Lab Results   Component Value Date    TSH " 2.250 07/24/2024    FREET4 1.26 07/24/2024       CV Diagnostics:  Procedures    CXR: No results found for this or any previous visit.     ECHO/MUGA: Results for orders placed in visit on 10/11/24    Adult Transthoracic Echo Complete W/ Cont if Necessary Per Protocol    Interpretation Summary    The left ventricular cavity is mildly dilated, with normal function. Estimated left ventricular EF = 60% Left ventricular ejection fraction appears to be 56 - 60%.    Left ventricular wall thickness is consistent with mild concentric hypertrophy.    The left atrial cavity is moderately dilated.    Moderate mitral valve regurgitation is present.    Mild aortic valve regurgitation.    Left ventricular diastolic function is consistent with (grade Ia w/high LAP) impaired relaxation.    Estimated right ventricular systolic pressure from tricuspid regurgitation is moderately elevated (45-55 mmHg). Calculated right ventricular systolic pressure from tricuspid regurgitation is 48 mmHg.     STRESS TESTS: No results found for this or any previous visit.     CARDIAC CATH: No results found for this or any previous visit.     DEVICES: No valid procedures specified.   HOLTER: No results found for this or any previous visit.     CT/MRI:  No results found for this or any previous visit.    VASCULAR: No valid procedures specified.     Assessment and Plan  Diagnoses and all orders for this visit:    1. Resistant hypertension (Primary)  Assessment & Plan:  Hypertension is borderline controlled, goal blood pressure less than 130/80 systolic due to baseline CKD.  Decrease hydralazine to 50 mg p.o. 3 times daily due to side effects.  Start isosorbide mononitrate 30 mg p.o. daily for blood pressure control.  Continue spironolactone 12.5 mg p.o. daily for now and recheck labs in 3 weeks.  If creatinine still trending up, plan to discontinue spironolactone and label as allergy.  Dietary sodium restriction.  Regular aerobic exercise.  Ambulatory blood  pressure monitoring.  Patient to bring her machine with her next visit for calibration.  Blood pressure will be reassessedin 3 weeks.    Orders:  -     hydrALAZINE (APRESOLINE) 50 MG tablet; Take 1 tablet by mouth 3 (Three) Times a Day. FOR BLOOD PRESSURE  Dispense: 90 tablet; Refill: 3  -     isosorbide mononitrate (IMDUR) 30 MG 24 hr tablet; Take 1 tablet by mouth Daily.  Dispense: 90 tablet; Refill: 3    2. Atypical chest pain  Assessment & Plan:  No recurrence.  Past pains worsened by lifting heavy objects objects/above shoulder level. No exertional component. Exam normal. EKG normal. Patient can have pain without arm movement.  Echocardiogram suggesting heart failure preserved ejection fraction, with normal EF, moderate LAE, moderate MR, grade 1 diastolic dysfunction with high filling pressures, moderate pulmonary hypertension. Plan:  Aggressive blood pressure control  Plan to check a proBNP next blood draw in 3 weeks and optimize CHF specific therapy  Patient counseled in regards to heart healthy, low fat/ low cholesterol/low sat diet, daily exercise for 30 minutes, low to moderate intensity, and weight loss.      3. Hyperlipidemia, mixed  Assessment & Plan:  Lipid abnormalities are improving with treatment.     Plan:  Continue same medication/s without change.  Atorvastatin 20 mg p.o. daily     Discussed medication dosage, use, side effects, and goals of treatment in detail.    Counseled patient on lifestyle modifications to help control hyperlipidemia.     Lab Results   Component Value Date    LDL 77 07/24/2024     (H) 01/22/2024    LDL 64 07/19/2023    HDL 59 07/24/2024    HDL 55 01/22/2024    HDL 51 07/19/2023    CHLPL 159 07/24/2024    CHLPL 197 01/22/2024    CHLPL 140 07/19/2023    TRIG 131 07/24/2024    TRIG 220 (H) 01/22/2024    TRIG 142 07/19/2023     Patient Treatment Goals:   LDL goal is under 100 due to age and primary prevention    Followup at the next regular appointment.        Return for  Follow-up with Dr Parra, Next scheduled follow up.    There are no Patient Instructions on file for this visit.    Kris Parra MD

## 2024-11-08 ENCOUNTER — CLINICAL SUPPORT (OUTPATIENT)
Dept: CARDIOLOGY | Facility: CLINIC | Age: 79
End: 2024-11-08
Payer: MEDICARE

## 2024-11-08 VITALS — DIASTOLIC BLOOD PRESSURE: 58 MMHG | SYSTOLIC BLOOD PRESSURE: 132 MMHG

## 2024-11-08 DIAGNOSIS — I10 HYPERTENSION, ESSENTIAL: ICD-10-CM

## 2024-11-08 DIAGNOSIS — E78.2 HYPERLIPIDEMIA, MIXED: ICD-10-CM

## 2024-11-08 DIAGNOSIS — I1A.0 RESISTANT HYPERTENSION: Primary | ICD-10-CM

## 2024-11-08 DIAGNOSIS — I51.89 DIASTOLIC DYSFUNCTION: ICD-10-CM

## 2024-11-08 DIAGNOSIS — Z00.00 ROUTINE GENERAL MEDICAL EXAMINATION AT A HEALTH CARE FACILITY: ICD-10-CM

## 2024-11-08 DIAGNOSIS — N18.32 STAGE 3B CHRONIC KIDNEY DISEASE: ICD-10-CM

## 2024-11-08 DIAGNOSIS — I50.31 ACUTE DIASTOLIC (CONGESTIVE) HEART FAILURE: ICD-10-CM

## 2024-11-08 DIAGNOSIS — R07.89 ATYPICAL CHEST PAIN: ICD-10-CM

## 2024-11-08 PROCEDURE — 36415 COLL VENOUS BLD VENIPUNCTURE: CPT | Performed by: INTERNAL MEDICINE

## 2024-11-08 NOTE — PROGRESS NOTES
Venipuncture Blood Specimen Collection  Venipuncture performed in clinic by Ashanti Harkins RN with good hemostasis. Patient tolerated the procedure well without complications.   11/08/24   Ashanti Harkins RN

## 2024-11-09 LAB
BUN SERPL-MCNC: 35 MG/DL (ref 8–27)
BUN/CREAT SERPL: 24 (ref 12–28)
CALCIUM SERPL-MCNC: 9.6 MG/DL (ref 8.7–10.3)
CHLORIDE SERPL-SCNC: 113 MMOL/L (ref 96–106)
CO2 SERPL-SCNC: 19 MMOL/L (ref 20–29)
CREAT SERPL-MCNC: 1.46 MG/DL (ref 0.57–1)
EGFRCR SERPLBLD CKD-EPI 2021: 36 ML/MIN/1.73
GLUCOSE SERPL-MCNC: 89 MG/DL (ref 70–99)
MAGNESIUM SERPL-MCNC: 2.2 MG/DL (ref 1.6–2.3)
NT-PROBNP SERPL-MCNC: 203 PG/ML (ref 0–738)
POTASSIUM SERPL-SCNC: 4.6 MMOL/L (ref 3.5–5.2)
SODIUM SERPL-SCNC: 144 MMOL/L (ref 134–144)

## 2024-11-11 ENCOUNTER — TELEPHONE (OUTPATIENT)
Dept: CARDIOLOGY | Facility: CLINIC | Age: 79
End: 2024-11-11
Payer: MEDICARE

## 2024-11-11 NOTE — TELEPHONE ENCOUNTER
----- Message from Kris Parra sent at 11/10/2024 10:07 PM EST -----  Please inform patient that her Blood work was unchanged.   Thank you!

## 2024-12-09 ENCOUNTER — OFFICE VISIT (OUTPATIENT)
Dept: CARDIOLOGY | Facility: CLINIC | Age: 79
End: 2024-12-09
Payer: MEDICARE

## 2024-12-09 VITALS
RESPIRATION RATE: 18 BRPM | BODY MASS INDEX: 28.06 KG/M2 | WEIGHT: 168.4 LBS | OXYGEN SATURATION: 97 % | HEIGHT: 65 IN | SYSTOLIC BLOOD PRESSURE: 150 MMHG | DIASTOLIC BLOOD PRESSURE: 70 MMHG | HEART RATE: 63 BPM

## 2024-12-09 DIAGNOSIS — I1A.0 RESISTANT HYPERTENSION: Primary | ICD-10-CM

## 2024-12-09 DIAGNOSIS — I20.89 ANGINA OF EFFORT: ICD-10-CM

## 2024-12-09 DIAGNOSIS — E78.2 HYPERLIPIDEMIA, MIXED: ICD-10-CM

## 2024-12-09 DIAGNOSIS — N18.32 STAGE 3B CHRONIC KIDNEY DISEASE: Chronic | ICD-10-CM

## 2024-12-09 PROCEDURE — 93000 ELECTROCARDIOGRAM COMPLETE: CPT | Performed by: INTERNAL MEDICINE

## 2024-12-09 PROCEDURE — 99214 OFFICE O/P EST MOD 30 MIN: CPT | Performed by: INTERNAL MEDICINE

## 2024-12-09 PROCEDURE — 1159F MED LIST DOCD IN RCRD: CPT | Performed by: INTERNAL MEDICINE

## 2024-12-09 PROCEDURE — 3077F SYST BP >= 140 MM HG: CPT | Performed by: INTERNAL MEDICINE

## 2024-12-09 PROCEDURE — 1160F RVW MEDS BY RX/DR IN RCRD: CPT | Performed by: INTERNAL MEDICINE

## 2024-12-09 PROCEDURE — 3078F DIAST BP <80 MM HG: CPT | Performed by: INTERNAL MEDICINE

## 2024-12-09 RX ORDER — NITROGLYCERIN 0.4 MG/1
TABLET SUBLINGUAL
Qty: 25 TABLET | Refills: 11 | Status: SHIPPED | OUTPATIENT
Start: 2024-12-09

## 2024-12-09 RX ORDER — ASPIRIN 81 MG/1
81 TABLET ORAL DAILY
Qty: 90 TABLET | Refills: 3 | Status: SHIPPED | OUTPATIENT
Start: 2024-12-09

## 2024-12-09 RX ORDER — ISOSORBIDE MONONITRATE 60 MG/1
60 TABLET, EXTENDED RELEASE ORAL DAILY
Qty: 90 TABLET | Refills: 1 | Status: SHIPPED | OUTPATIENT
Start: 2024-12-09

## 2024-12-09 NOTE — ASSESSMENT & PLAN NOTE
Lipid abnormalities are improving with treatment.     Plan:  Continue same medication/s without change.  Atorvastatin 20 mg p.o. daily     Discussed medication dosage, use, side effects, and goals of treatment in detail.    Counseled patient on lifestyle modifications to help control hyperlipidemia.     Lab Results   Component Value Date    LDL 77 07/24/2024     (H) 01/22/2024    HDL 59 07/24/2024    HDL 55 01/22/2024    CHLPL 159 07/24/2024    CHLPL 197 01/22/2024    TRIG 131 07/24/2024    TRIG 220 (H) 01/22/2024       Patient Treatment Goals:   LDL goal is under  milligram per deciliter, to target     Followup at the next regular appointment.

## 2024-12-09 NOTE — ASSESSMENT & PLAN NOTE
Hypertension is not well-controlled controlled, goal blood pressure less than 130/80 systolic due to baseline CKD.  Continue hydralazine to 50 mg p.o. 3 times daily due to side effects.  Uptitrate isosorbide mononitrate 60 mg p.o. daily for blood pressure control.  Continue spironolactone 12.5 mg p.o. daily; labs stable.  Dietary sodium restriction.  Regular aerobic exercise.  Continue ambulatory blood pressure monitoring.  Workup for secondary hypertension: Renal artery Doppler.  Blood pressure will be reassessedin 3 weeks.

## 2024-12-09 NOTE — PROGRESS NOTES
[facility] [place]  [facility] CARDIOLOGY  [address] [place] [address]  Dept: [phone]  Dept Fax: [phone]    Date: 12/09/2024  Patient: Cecilia Crawley  YOB: 1945    Follow Up Office Visit Note    Interval Follow-up  Ms. Cecilia Crawley is a 79 y.o. female who is here for follow-up on resistant hypertension.    Subjective   Patient complaining of chest, bilateral shoulders and neck pain on mild to moderate intensity activities, relieved by rest.  Significantly limited on a day-to-day basis.  Blood pressure poorly controlled at home as well.  Patient denies orthopnea, PND, palpitations, lightheadedness, syncope or medications side-effects.    The following portions of the patient's history were reviewed and updated as appropriate: allergies, current medications, past family history, past medical history, past social history, past surgical history, and problem list.    Medications:   Allergies   Allergen Reactions    Beta Adrenergic Blockers Arrhythmia    Hydrochlorothiazide Other (See Comments)     gout    Prazosin Myalgia    Sulfa Antibiotics Itching      Current Outpatient Medications   Medication Instructions    amLODIPine-valsartan (Exforge)  MG per tablet 1 tablet, Oral, Daily    aspirin 81 mg, Oral, Daily    atorvastatin (LIPITOR) 20 mg, Oral, Daily    cholecalciferol (VITAMIN D3) 2,000 Units, Oral, Daily    colchicine 0.6 mg, Oral, 2 Times Daily PRN    folic acid (FOLVITE) 1 mg, Oral, Daily    hydrALAZINE (APRESOLINE) 50 mg, Oral, 3 Times Daily, FOR BLOOD PRESSURE    isosorbide mononitrate (IMDUR) 60 mg, Oral, Daily    levothyroxine (SYNTHROID, LEVOTHROID) 75 mcg, Oral, Daily    nitroglycerin (NITROSTAT) 0.4 MG SL tablet 1 under the tongue as needed for angina, may repeat every 5 mins for up three doses.  If no resolution of chest pain, call 911 or head to the nearest emergency room.    spironolactone (ALDACTONE) 12.5 mg, Oral, Daily    vitamin B-12  "(CYANOCOBALAMIN) 1,000 mcg, Oral, Every Other Day       Tobacco Use: Medium Risk (12/9/2024)    Patient History     Smoking Tobacco Use: Former     Smokeless Tobacco Use: Never     Passive Exposure: Not on file        Objective  Vitals:    12/09/24 1117   BP: 150/70   Pulse: 63   Resp: 18   SpO2: 97%   Weight: 76.4 kg (168 lb 6.4 oz)   Height: 165.1 cm (65\")   PainSc:   9   PainLoc: Chest      Vitals:    12/09/24 1117   BP: 150/70   Pulse: 63   Resp: 18   SpO2: 97%   Weight: 76.4 kg (168 lb 6.4 oz)   Height: 165.1 cm (65\")          Physical Exam  Constitutional:       Appearance: Not in distress.   Neck:      Vascular: JVD normal.   Pulmonary:      Breath sounds: Normal breath sounds.   Cardiovascular:      Normal rate. Regular rhythm.      Murmurs: There is no murmur.   Pulses:     Intact distal pulses.   Edema:     Peripheral edema absent.   Neurological:      Mental Status: Alert.              Diagnostic Data  Lab Results   Component Value Date     11/08/2024    K 4.6 11/08/2024     (H) 11/08/2024    CO2 19 (L) 11/08/2024    MG 2.2 11/08/2024    BUN 35 (H) 11/08/2024    CREATININE 1.46 (H) 11/08/2024    CALCIUM 9.6 11/08/2024    BILITOT 0.3 07/24/2024    ALKPHOS 72 07/24/2024    ALT 15 07/24/2024    AST 30 07/24/2024    GLUCOSE 89 11/08/2024    ALBUMIN 4.1 07/24/2024     Lab Results   Component Value Date    WBC 7.4 07/24/2024    HGB 12.3 07/24/2024    HCT 38.0 07/24/2024     07/24/2024     No results found for: \"APTT\", \"INR\", \"PTT\"  No results found for: \"CKTOTAL\", \"TROPONINI\", \"TROPONINT\", \"CKMBINDEX\", \"BNP\"  Lab Results   Component Value Date    PROBNP 203 11/08/2024       Lab Results   Component Value Date    CHLPL 159 07/24/2024    TRIG 131 07/24/2024    HDL 59 07/24/2024    LDL 77 07/24/2024     Lab Results   Component Value Date    TSH 2.250 07/24/2024    FREET4 1.26 07/24/2024       [facility]:    ECG 12 Lead    Date/Time: 12/9/2024 12:33 PM  Performed by: Kris Parra, " MD    Authorized by: Kris Parra MD  Comparison: compared with previous ECG from 9/27/2024  Similar to previous ECG  Rhythm: sinus bradycardia  Comments: Sinus bradycardia          CXR: No results found for this or any previous visit.     ECHO/MUGA: Results for orders placed in visit on 10/11/24    Adult Transthoracic Echo Complete W/ Cont if Necessary Per Protocol    Interpretation Summary    The left ventricular cavity is mildly dilated, with normal function. Estimated left ventricular EF = 60% Left ventricular ejection fraction appears to be 56 - 60%.    Left ventricular wall thickness is consistent with mild concentric hypertrophy.    The left atrial cavity is moderately dilated.    Moderate mitral valve regurgitation is present.    Mild aortic valve regurgitation.    Left ventricular diastolic function is consistent with (grade Ia w/high LAP) impaired relaxation.    Estimated right ventricular systolic pressure from tricuspid regurgitation is moderately elevated (45-55 mmHg). Calculated right ventricular systolic pressure from tricuspid regurgitation is 48 mmHg.     STRESS TESTS: No results found for this or any previous visit.     CARDIAC CATH: No results found for this or any previous visit.     DEVICES: No valid procedures specified.   HOLTER: No results found for this or any previous visit.     CT/MRI:  No results found for this or any previous visit.    VASCULAR: No valid procedures specified.     Assessment and Plan  Diagnoses and all orders for this visit:    1. Resistant hypertension (Primary)  Assessment & Plan:  Hypertension is not well-controlled controlled, goal blood pressure less than 130/80 systolic due to baseline CKD.  Continue hydralazine to 50 mg p.o. 3 times daily due to side effects.  Uptitrate isosorbide mononitrate 60 mg p.o. daily for blood pressure control.  Continue spironolactone 12.5 mg p.o. daily; labs stable.  Dietary sodium restriction.  Regular aerobic exercise.  Continue  ambulatory blood pressure monitoring.  Workup for secondary hypertension: Renal artery Doppler.  Blood pressure will be reassessedin 3 weeks.    Orders:  -     Duplex Renal Artery - Bilateral Complete CAR; Future  -     isosorbide mononitrate (IMDUR) 60 MG 24 hr tablet; Take 1 tablet by mouth Daily.  Dispense: 90 tablet; Refill: 1    2. Angina of effort  Assessment & Plan:  Anginal pains worsened by lifting heavy objects objects/above shoulder level, currently occurring on mild to moderate intensity activities.  Relieved by rest.  Exam normal. EKG normal. Echocardiogram suggesting heart failure preserved ejection fraction, with normal EF, moderate LAE, moderate MR, grade 1 diastolic dysfunction with high filling pressures, moderate pulmonary hypertension. Plan:  Continue with aggressive blood pressure control  Uptitrate isosorbide mononitrate to 60 mg p.o. daily for angina  Start aspirin 81 mg p.o. daily and nitroglycerin sublingual as needed for chest pain  Refer patient for stress testing type Lexiscan nuclear stress test after risk-benefit discussion as well as possible need for cardiac catheterization; shared decision making agreeable to above workup  Patient counseled in regards to heart healthy, low fat/ low cholesterol/low sat diet, daily exercise for 30 minutes, low to moderate intensity, and weight loss.    Orders:  -     Stress Test With Myocardial Perfusion One Day; Future  -     aspirin 81 MG EC tablet; Take 1 tablet by mouth Daily.  Dispense: 90 tablet; Refill: 3  -     nitroglycerin (NITROSTAT) 0.4 MG SL tablet; 1 under the tongue as needed for angina, may repeat every 5 mins for up three doses.  If no resolution of chest pain, call 911 or head to the nearest emergency room.  Dispense: 25 tablet; Refill: 11  -     ECG 12 Lead    3. Hyperlipidemia, mixed  Assessment & Plan:   Lipid abnormalities are improving with treatment.     Plan:  Continue same medication/s without change.  Atorvastatin 20 mg p.o.  daily     Discussed medication dosage, use, side effects, and goals of treatment in detail.    Counseled patient on lifestyle modifications to help control hyperlipidemia.     Lab Results   Component Value Date    LDL 77 07/24/2024     (H) 01/22/2024    HDL 59 07/24/2024    HDL 55 01/22/2024    CHLPL 159 07/24/2024    CHLPL 197 01/22/2024    TRIG 131 07/24/2024    TRIG 220 (H) 01/22/2024       Patient Treatment Goals:   LDL goal is under  milligram per deciliter, to target     Followup at the next regular appointment.      4. Stage 3b chronic kidney disease  -     Duplex Renal Artery - Bilateral Complete CAR; Future         Return in about 2 months (around 2/9/2025) for Follow-up with Dr Prara.    There are no Patient Instructions on file for this visit.    Kris Parra MD

## 2024-12-09 NOTE — ASSESSMENT & PLAN NOTE
Anginal pains worsened by lifting heavy objects objects/above shoulder level, currently occurring on mild to moderate intensity activities.  Relieved by rest.  Exam normal. EKG normal. Echocardiogram suggesting heart failure preserved ejection fraction, with normal EF, moderate LAE, moderate MR, grade 1 diastolic dysfunction with high filling pressures, moderate pulmonary hypertension. Plan:  Continue with aggressive blood pressure control  Uptitrate isosorbide mononitrate to 60 mg p.o. daily for angina  Start aspirin 81 mg p.o. daily and nitroglycerin sublingual as needed for chest pain  Refer patient for stress testing type Lexiscan nuclear stress test after risk-benefit discussion as well as possible need for cardiac catheterization; shared decision making agreeable to above workup  Patient counseled in regards to heart healthy, low fat/ low cholesterol/low sat diet, daily exercise for 30 minutes, low to moderate intensity, and weight loss.

## 2024-12-27 ENCOUNTER — HOSPITAL ENCOUNTER (OUTPATIENT)
Dept: CARDIOLOGY | Facility: HOSPITAL | Age: 79
Discharge: HOME | End: 2024-12-27
Payer: MEDICARE

## 2024-12-27 VITALS — HEIGHT: 65 IN | WEIGHT: 168 LBS | BODY MASS INDEX: 27.99 KG/M2

## 2024-12-27 DIAGNOSIS — N18.32 STAGE 3B CHRONIC KIDNEY DISEASE: Chronic | ICD-10-CM

## 2024-12-27 DIAGNOSIS — I1A.0 RESISTANT HYPERTENSION: ICD-10-CM

## 2024-12-27 LAB
BH CV ECHO MEAS - DIST REN A EDV LEFT: 11.7 CM/S
BH CV ECHO MEAS - DIST REN A PSV LEFT: 71 CM/S
BH CV ECHO MEAS - MID REN A EDV LEFT: 13 CM/S
BH CV ECHO MEAS - MID REN A PSV LEFT: 96.9 CM/S
BH CV ECHO MEAS - PROX REN A EDV LEFT: 10.4 CM/S
BH CV ECHO MEAS - PROX REN A PSV LEFT: 107.3 CM/S
BH CV VAS RENAL AORTIC MID PSV: 75 CM/S
BH CV VAS RENAL HILUM LEFT EDV: 8 CM/S
BH CV VAS RENAL HILUM LEFT PSV: 77 CM/S
BH CV VAS RENAL HILUM RIGHT EDV: 12 CM/S
BH CV VAS RENAL HILUM RIGHT PSV: 56 CM/S
BH CV XLRA MEAS - KID L LEFT: 11 CM
BH CV XLRA MEAS DIST REN A EDV RIGHT: 6.7 CM/S
BH CV XLRA MEAS DIST REN A PSV RIGHT: 63.4 CM/S
BH CV XLRA MEAS KID L RIGHT: 11.1 CM
BH CV XLRA MEAS KID W RIGHT: 4.8 CM
BH CV XLRA MEAS MID REN A EDV RIGHT: 12.6 CM/S
BH CV XLRA MEAS MID REN A PSV RIGHT: 137.8 CM/S
BH CV XLRA MEAS PROX REN A EDV RIGHT: 9.2 CM/S
BH CV XLRA MEAS PROX REN A PSV RIGHT: 111.7 CM/S
BH CV XLRA MEAS RAR LEFT: 1.4
BH CV XLRA MEAS RAR RIGHT: 1.8
BH CV XLRA MEAS RENAL A ORG EDV LEFT: 8.7 CM/S
BH CV XLRA MEAS RENAL A ORG EDV RIGHT: 7.6 CM/S
BH CV XLRA MEAS RENAL A ORG PSV LEFT: 88.7 CM/S
BH CV XLRA MEAS RENAL A ORG PSV RIGHT: 94.9 CM/S
LEFT KIDNEY WIDTH: 5 CM
LEFT RENAL UPPER PARENCHYMA MAX: 26 CM/S
LEFT RENAL UPPER PARENCHYMA MIN: 6 CM/S
LEFT RENAL UPPER PARENCHYMA RI: 0.77
RIGHT RENAL UPPER PARENCHYMA MAX: 33 CM/S
RIGHT RENAL UPPER PARENCHYMA MIN: 6 CM/S
RIGHT RENAL UPPER PARENCHYMA RI: 0.82

## 2024-12-27 PROCEDURE — 93975 VASCULAR STUDY: CPT

## 2024-12-30 ENCOUNTER — OUTSIDE FACILITY SERVICE (OUTPATIENT)
Dept: CARDIOLOGY | Facility: CLINIC | Age: 79
End: 2024-12-30
Payer: MEDICARE

## 2024-12-30 ENCOUNTER — TELEPHONE (OUTPATIENT)
Dept: CARDIOLOGY | Facility: CLINIC | Age: 79
End: 2024-12-30
Payer: MEDICARE

## 2024-12-30 NOTE — TELEPHONE ENCOUNTER
Name: Cecilia Crawley      Relationship: Self      Best Callback Number: [phone]      HUB PROVIDED THE RELAY MESSAGE FROM THE OFFICE      PATIENT: VOICED UNDERSTANDING AND HAS NO FURTHER QUESTIONS AT THIS TIME    ADDITIONAL INFORMATION: PATIENT ONLY HAS ONE BP READING AND IT WAS TAKEN RECENTLY. IT /75. SHE IS NOT CURRENTLY HAVING CHEST PAINS.

## 2024-12-30 NOTE — TELEPHONE ENCOUNTER
Call placed to patient to discuss renal doppler results. No answer, left voicemail requesting call back.   OK for Hub to relay-  Please inform patient that her renal Doppler was normal.     Also need to check with patient regarding home BP readings and chest pain.   HUB- please ask patient for home BP log and relay back to office

## 2024-12-30 NOTE — TELEPHONE ENCOUNTER
----- Message from Kris Parra sent at 12/27/2024  4:21 PM EST -----  Please inform patient that her renal Doppler was normal.   Thank you!

## 2024-12-31 ENCOUNTER — TELEPHONE (OUTPATIENT)
Dept: CARDIOLOGY | Facility: CLINIC | Age: 79
End: 2024-12-31
Payer: MEDICARE

## 2024-12-31 NOTE — TELEPHONE ENCOUNTER
HUB CAN SHARE  Left message for Ms. Crawley that her nuclear stress test was normal. Please call back if any further questions.

## 2024-12-31 NOTE — TELEPHONE ENCOUNTER
----- Message from Kris Parra sent at 12/31/2024 12:26 PM EST -----  Please inform patient that her lexiscan nuclear stress test was normal.   Thank you!

## 2025-01-29 ENCOUNTER — LAB (OUTPATIENT)
Dept: FAMILY MEDICINE CLINIC | Facility: CLINIC | Age: 80
End: 2025-01-29
Payer: MEDICARE

## 2025-01-29 DIAGNOSIS — N18.32 STAGE 3B CHRONIC KIDNEY DISEASE: ICD-10-CM

## 2025-01-29 DIAGNOSIS — I10 HYPERTENSION, ESSENTIAL: Chronic | ICD-10-CM

## 2025-01-29 DIAGNOSIS — E03.9 HYPOTHYROIDISM (ACQUIRED): Chronic | ICD-10-CM

## 2025-01-29 DIAGNOSIS — R73.9 HYPERGLYCEMIA: Chronic | ICD-10-CM

## 2025-01-29 DIAGNOSIS — E78.2 HYPERLIPIDEMIA, MIXED: Chronic | ICD-10-CM

## 2025-01-30 LAB
ALBUMIN SERPL-MCNC: 4.3 G/DL (ref 3.7–4.7)
ALP SERPL-CCNC: 77 IU/L (ref 44–121)
ALT SERPL-CCNC: 16 IU/L (ref 0–32)
AST SERPL-CCNC: 25 IU/L (ref 0–40)
BILIRUB SERPL-MCNC: 0.2 MG/DL (ref 0–1.2)
BUN SERPL-MCNC: 36 MG/DL (ref 8–27)
BUN/CREAT SERPL: 23 (ref 12–28)
CALCIUM SERPL-MCNC: 9.5 MG/DL (ref 8.7–10.3)
CHLORIDE SERPL-SCNC: 109 MMOL/L (ref 96–106)
CHOLEST SERPL-MCNC: 135 MG/DL (ref 100–199)
CO2 SERPL-SCNC: 19 MMOL/L (ref 20–29)
CREAT SERPL-MCNC: 1.54 MG/DL (ref 0.57–1)
EGFRCR SERPLBLD CKD-EPI 2021: 34 ML/MIN/1.73
GLOBULIN SER CALC-MCNC: 2.3 G/DL (ref 1.5–4.5)
GLUCOSE SERPL-MCNC: 92 MG/DL (ref 70–99)
HBA1C MFR BLD: 5.7 % (ref 4.8–5.6)
HDLC SERPL-MCNC: 46 MG/DL
LDLC SERPL CALC-MCNC: 61 MG/DL (ref 0–99)
POTASSIUM SERPL-SCNC: 4.6 MMOL/L (ref 3.5–5.2)
PROT SERPL-MCNC: 6.6 G/DL (ref 6–8.5)
SODIUM SERPL-SCNC: 142 MMOL/L (ref 134–144)
T4 FREE SERPL-MCNC: 1.16 NG/DL (ref 0.82–1.77)
TRIGL SERPL-MCNC: 167 MG/DL (ref 0–149)
TSH SERPL DL<=0.005 MIU/L-ACNC: 3.12 UIU/ML (ref 0.45–4.5)
VLDLC SERPL CALC-MCNC: 28 MG/DL (ref 5–40)

## 2025-02-03 ENCOUNTER — OFFICE VISIT (OUTPATIENT)
Dept: FAMILY MEDICINE CLINIC | Facility: CLINIC | Age: 80
End: 2025-02-03
Payer: MEDICARE

## 2025-02-03 VITALS
HEART RATE: 61 BPM | SYSTOLIC BLOOD PRESSURE: 150 MMHG | HEIGHT: 65 IN | WEIGHT: 170.7 LBS | DIASTOLIC BLOOD PRESSURE: 68 MMHG | OXYGEN SATURATION: 97 % | BODY MASS INDEX: 28.44 KG/M2

## 2025-02-03 DIAGNOSIS — R92.8 ABNORMALITY OF RIGHT BREAST ON SCREENING MAMMOGRAM: Chronic | ICD-10-CM

## 2025-02-03 DIAGNOSIS — E66.3 OVERWEIGHT (BMI 25.0-29.9): ICD-10-CM

## 2025-02-03 DIAGNOSIS — N18.32 STAGE 3B CHRONIC KIDNEY DISEASE: Chronic | ICD-10-CM

## 2025-02-03 DIAGNOSIS — M10.271 ACUTE DRUG-INDUCED GOUT INVOLVING TOE OF RIGHT FOOT: ICD-10-CM

## 2025-02-03 DIAGNOSIS — E03.9 HYPOTHYROIDISM (ACQUIRED): Chronic | ICD-10-CM

## 2025-02-03 DIAGNOSIS — E55.9 VITAMIN D DEFICIENCY: Chronic | ICD-10-CM

## 2025-02-03 DIAGNOSIS — R73.9 HYPERGLYCEMIA: Chronic | ICD-10-CM

## 2025-02-03 DIAGNOSIS — R80.9 MICROALBUMINURIA: Chronic | ICD-10-CM

## 2025-02-03 DIAGNOSIS — E53.8 VITAMIN B12 DEFICIENCY: Chronic | ICD-10-CM

## 2025-02-03 DIAGNOSIS — R07.89 ATYPICAL CHEST PAIN: ICD-10-CM

## 2025-02-03 DIAGNOSIS — Z00.00 GENERAL MEDICAL EXAM: Primary | ICD-10-CM

## 2025-02-03 DIAGNOSIS — I1A.0 RESISTANT HYPERTENSION: ICD-10-CM

## 2025-02-03 DIAGNOSIS — E78.2 HYPERLIPIDEMIA, MIXED: Chronic | ICD-10-CM

## 2025-02-03 DIAGNOSIS — H40.9 GLAUCOMA OF BOTH EYES, UNSPECIFIED GLAUCOMA TYPE: Chronic | ICD-10-CM

## 2025-02-03 DIAGNOSIS — I10 HYPERTENSION, ESSENTIAL: Chronic | ICD-10-CM

## 2025-02-03 PROCEDURE — 3077F SYST BP >= 140 MM HG: CPT | Performed by: FAMILY MEDICINE

## 2025-02-03 PROCEDURE — 99397 PER PM REEVAL EST PAT 65+ YR: CPT | Performed by: FAMILY MEDICINE

## 2025-02-03 PROCEDURE — 1170F FXNL STATUS ASSESSED: CPT | Performed by: FAMILY MEDICINE

## 2025-02-03 PROCEDURE — 1159F MED LIST DOCD IN RCRD: CPT | Performed by: FAMILY MEDICINE

## 2025-02-03 PROCEDURE — 1126F AMNT PAIN NOTED NONE PRSNT: CPT | Performed by: FAMILY MEDICINE

## 2025-02-03 PROCEDURE — 1160F RVW MEDS BY RX/DR IN RCRD: CPT | Performed by: FAMILY MEDICINE

## 2025-02-03 PROCEDURE — 3078F DIAST BP <80 MM HG: CPT | Performed by: FAMILY MEDICINE

## 2025-02-03 PROCEDURE — G0439 PPPS, SUBSEQ VISIT: HCPCS | Performed by: FAMILY MEDICINE

## 2025-02-03 PROCEDURE — 96160 PT-FOCUSED HLTH RISK ASSMT: CPT | Performed by: FAMILY MEDICINE

## 2025-02-03 PROCEDURE — 99214 OFFICE O/P EST MOD 30 MIN: CPT | Performed by: FAMILY MEDICINE

## 2025-02-03 RX ORDER — ATORVASTATIN CALCIUM 20 MG/1
20 TABLET, FILM COATED ORAL DAILY
Qty: 90 TABLET | Refills: 2 | Status: SHIPPED | OUTPATIENT
Start: 2025-02-03

## 2025-02-03 RX ORDER — FOLIC ACID 1 MG/1
1 TABLET ORAL DAILY
Qty: 90 TABLET | Refills: 2 | Status: SHIPPED | OUTPATIENT
Start: 2025-02-03

## 2025-02-03 RX ORDER — LEVOTHYROXINE SODIUM 75 UG/1
75 TABLET ORAL DAILY
Qty: 90 TABLET | Refills: 2 | Status: SHIPPED | OUTPATIENT
Start: 2025-02-03

## 2025-02-03 RX ORDER — AMLODIPINE AND VALSARTAN 10; 320 MG/1; MG/1
1 TABLET ORAL DAILY
Qty: 90 TABLET | Refills: 2 | Status: SHIPPED | OUTPATIENT
Start: 2025-02-03

## 2025-02-03 NOTE — ASSESSMENT & PLAN NOTE
Patient's (Body mass index is 28.41 kg/m².) indicates that they are overweight with health conditions that include hypertension and dyslipidemias . Weight is improving with lifestyle modifications. BMI is is above average; BMI management plan is completed. We discussed portion control and increasing exercise.

## 2025-02-03 NOTE — ASSESSMENT & PLAN NOTE
Mammogram up-to-date at [facility] June 24, 2024 with need for followup imaging of the R breast - this was completed on 8/22/24 with no findings concerning for breast cancer and plan to recheck a R breast diagnostic mammogram and ultrasound in 6 mos (Feb 2025) - this has been ordered and scheduled.

## 2025-02-03 NOTE — ASSESSMENT & PLAN NOTE
Discussed together health maintenance and screening along with vaccination options and healthy diet and exercise habits as part of the preventative counseling at their physical exam today.     Encouraged diet, exercise, wt loss    Declines DEXA this year - will consider for Jan 2026    Scheduled for 6 month followup dx mammo and ultrasound at [facility] [place]    Encouraged advanced directive - she will get us a copy for chart update

## 2025-02-03 NOTE — PROGRESS NOTES
Subjective   The ABCs of the Annual Wellness Visit  Medicare Wellness Visit      Cecilia Crawley is a 79 y.o. patient who presents for a Medicare Wellness Visit.    The following portions of the patient's history were reviewed and   updated as appropriate: allergies, current medications, past family history, past medical history, past social history, past surgical history, and problem list.    Compared to one year ago, the patient's physical   health is the same.  Compared to one year ago, the patient's mental   health is the same.    Recent Hospitalizations:  She was not admitted to the hospital during the last year.     Current Medical Providers:  Patient Care Team:  Kalyan Rocha MD as PCP - General (Family Medicine)  Kris Parra MD as Cardiologist (Cardiology)    Outpatient Medications Prior to Visit   Medication Sig Dispense Refill    aspirin 81 MG EC tablet Take 1 tablet by mouth Daily. 90 tablet 3    cholecalciferol (VITAMIN D3) 25 MCG (1000 UT) tablet Take 2 tablets by mouth Daily.      colchicine 0.6 MG tablet Take 1 tablet by mouth 2 (Two) Times a Day As Needed for Muscle / Joint Pain (gout flareup). 30 tablet 3    hydrALAZINE (APRESOLINE) 50 MG tablet Take 1 tablet by mouth 3 (Three) Times a Day. FOR BLOOD PRESSURE 90 tablet 3    isosorbide mononitrate (IMDUR) 60 MG 24 hr tablet Take 1 tablet by mouth Daily. 90 tablet 1    nitroglycerin (NITROSTAT) 0.4 MG SL tablet 1 under the tongue as needed for angina, may repeat every 5 mins for up three doses.  If no resolution of chest pain, call 911 or head to the nearest emergency room. 25 tablet 11    spironolactone (ALDACTONE) 25 MG tablet Take 0.5 tablets by mouth Daily. 90 tablet 3    vitamin B-12 (CYANOCOBALAMIN) 1000 MCG tablet Take 1 tablet by mouth Every Other Day.      amLODIPine-valsartan (Exforge)  MG per tablet Take 1 tablet by mouth Daily. 90 tablet 2    atorvastatin (LIPITOR) 20 MG tablet Take 1 tablet by mouth Daily. 90  "tablet 2    folic acid (FOLVITE) 1 MG tablet Take 1 tablet by mouth Daily. 90 tablet 2    levothyroxine (SYNTHROID, LEVOTHROID) 75 MCG tablet Take 1 tablet by mouth Daily. 90 tablet 2     No facility-administered medications prior to visit.     No opioid medication identified on active medication list. I have reviewed chart for other potential  high risk medication/s and harmful drug interactions in the elderly.      Aspirin is on active medication list. Aspirin use is indicated based on review of current medical condition/s. Pros and cons of this therapy have been discussed today. Benefits of this medication outweigh potential harm.  Patient has been encouraged to continue taking this medication.  .      Patient Active Problem List   Diagnosis    Hypertension, essential    Hyperlipidemia, mixed    Vitamin D deficiency    Glaucoma of both eyes    Hypothyroidism (acquired)    Vitamin B12 deficiency    Hyperglycemia    Microalbuminuria    General medical exam    Stage 3b chronic kidney disease    Acute drug-induced gout involving toe of right foot    Overweight (BMI 25.0-29.9)    Abnormality of right breast on screening mammogram    Resistant hypertension    Atypical chest pain     Advance Care Planning Advance Directive is not on file.  ACP discussion was held with the patient during this visit. Patient does not have an advance directive, information provided.            Objective   Vitals:    02/03/25 0820   BP: 150/68   BP Location: Left arm   Patient Position: Sitting   Cuff Size: Adult   Pulse: 61   SpO2: 97%   Weight: 77.4 kg (170 lb 11.2 oz)   Height: 165.1 cm (65\")   PainSc: 0-No pain       Estimated body mass index is 28.41 kg/m² as calculated from the following:    Height as of this encounter: 165.1 cm (65\").    Weight as of this encounter: 77.4 kg (170 lb 11.2 oz).                Does the patient have evidence of cognitive impairment? No  Lab Results   Component Value Date    CHLPL 135 01/29/2025    TRIG " 167 (H) 2025    HDL 46 2025    LDL 61 2025    VLDL 28 2025    HGBA1C 5.7 (H) 2025                                                                                                Health  Risk Assessment    Smoking Status:  Social History     Tobacco Use   Smoking Status Former    Current packs/day: 0.00    Average packs/day: 0.5 packs/day for 40.0 years (20.0 ttl pk-yrs)    Types: Cigarettes    Start date:     Quit date:     Years since quittin.1   Smokeless Tobacco Never     Alcohol Consumption:  Social History     Substance and Sexual Activity   Alcohol Use Not Currently       Fall Risk Screen  STEADI Fall Risk Assessment was completed, and patient is at MODERATE risk for falls. Assessment completed on:2/3/2025    Depression Screening   Little interest or pleasure in doing things? Not at all   Feeling down, depressed, or hopeless? Not at all   PHQ-2 Total Score 0      Health Habits and Functional and Cognitive Screenin/3/2025     8:22 AM   Functional & Cognitive Status   Do you have difficulty preparing food and eating? No   Do you have difficulty bathing yourself, getting dressed or grooming yourself? No   Do you have difficulty using the toilet? No   Do you have difficulty moving around from place to place? No   Do you have trouble with steps or getting out of a bed or a chair? No   Current Diet Well Balanced Diet   Dental Exam Up to date   Eye Exam Up to date   Exercise (times per week) 7 times per week   Current Exercises Include Walking   Do you need help using the phone?  No   Are you deaf or do you have serious difficulty hearing?  No   Do you need help to go to places out of walking distance? No   Do you need help shopping? No   Do you need help preparing meals?  No   Do you need help with housework?  No   Do you need help with laundry? No   Do you need help taking your medications? No   Do you need help managing money? No   Do you ever drive or ride in a  car without wearing a seat belt? No   Have you felt unusual stress, anger or loneliness in the last month? No   Who do you live with? Spouse   If you need help, do you have trouble finding someone available to you? No   Have you been bothered in the last four weeks by sexual problems? No   Do you have difficulty concentrating, remembering or making decisions? No           Age-appropriate Screening Schedule:  Refer to the list below for future screening recommendations based on patient's age, sex and/or medical conditions. Orders for these recommended tests are listed in the plan section. The patient has been provided with a written plan.    Health Maintenance List  Health Maintenance   Topic Date Due    DIABETIC EYE EXAM  03/24/2025 (Originally 1/17/2025)    DXA SCAN  01/01/2026 (Originally 1/31/2025)    HEMOGLOBIN A1C  07/29/2025    LIPID PANEL  01/29/2026    ANNUAL WELLNESS VISIT  02/03/2026    BMI FOLLOWUP  02/03/2026    COVID-19 Vaccine  Completed    RSV Vaccine - Adults  Completed    INFLUENZA VACCINE  Completed    Pneumococcal Vaccine 65+  Completed    DIABETIC FOOT EXAM  Discontinued    URINE MICROALBUMIN  Discontinued    TDAP/TD VACCINES  Discontinued    ZOSTER VACCINE  Discontinued                                                                                                                                                CMS Preventative Services Quick Reference  Risk Factors Identified During Encounter  Fall Risk-High or Moderate: Discussed Fall Prevention in the home    The above risks/problems have been discussed with the patient.  Pertinent information has been shared with the patient in the After Visit Summary.  An After Visit Summary and PPPS were made available to the patient.    Follow Up:   Next Medicare Wellness visit to be scheduled in 1 year.         Additional E&M Note during same encounter follows:  Patient has additional, significant, and separately identifiable condition(s)/problem(s) that  require work above and beyond the Medicare Wellness Visit     Chief Complaint  Hypertension - resistant, now following with Cardiology (Dr Parra).  Recheck on Hyperlipidemia, Hypothyroidism, Hyperglycemia    Subjective   HPI  Cecilia is also being seen today for an annual adult preventative physical exam.  and Cecilia is also being seen today for additional medical problem/s.    Hypertension - resistant, now following with Cardiology (Dr Parra).  Recheck on Hyperlipidemia, Hypothyroidism, Hyperglycemia    Cecilia Crawley is a 79 y.o. female who presents today for follow-up visit and medication refills.    Doing well since our visit together in Sept 2024.      We did adjust up hydralazine to 50mg TID given uncontrolled BP on exforge and past problems with bradycardia with b-blockers and CKD-IIIb last year and she has also established care with Cardiology (Dr Parra). No on spironolactone along with imdur and prn Ntg (But hasn't used any)     They did get further workup done with stress testing, echo, and renal artery scan without evidence for renal artery stenosis.      Home blood pressure readings remain elevated - followup next week with cardiology scheduled.     No headaches or blurry vision.    She did have a flareup with gout in her right great toe and uric acid level did return elevated above 9 in the past.  We did stop her HCTZ.  She does have colchicine to use for flareups as needed.  Repeat uric acid returned significantly improved off HCTZ (down from 9.7 to 7.3). No gout flareups off HCTZ! Wants to wait on daily meds for gout unless she has future flareups.      Last labs in Jan 2025 returned with ongoing stage IIIb chronic kidney disease related to hypertension.      She does understand the need to avoid NSAIDs given chronic kidney disease.      She did have a microalbumin test at her Medicare wellness visit in January 2024 that returned positive.  She does continue on her ARB for hypertension which  also helps with renal protection.     She continues to work hard on diet and exercise efforts given concerns for prediabetes.     Her last A1c was stable at 5.7 in Jan 2025     Continues with ophthalmology follow-up for glaucoma.  Underwent glaucoma sgy in 2023 and is off drops!  Good diabetic eye checkup Jan 2025.      On B12 and folic acid along with vitamin D given past history of vitamin deficiencies.     Continues atorvastatin for hyperlipidemia.  Her most recent lipid panel done showed LDL at  61 Jan 2025.      On Synthroid for hypothyroidism with no missed doses.  Recent thyroid studies returned normal with blood work done Jan 2025     She reports she does have an advance directive and will get us a copy for chart update.     DEXA for osteoporosis screening January 2023.  It returned normal.  Discussed repeat - she declines and wants to wait until Jan 2026 for DEXA.    Mammogram up-to-date at [facility] 24, 2024 with need for followup imaging of the R breast - this was completed on 8/22/24 with no findings concerning for breast cancer and plan to recheck a R breast diagnostic mammogram and ultrasound in 6 mos (Feb 2025) - this has been ordered and scheduled.         Review of Systems   Constitutional:  Negative for activity change, appetite change, chills, fatigue and fever.   HENT:  Negative for ear pain, hearing loss and trouble swallowing.    Eyes:  Negative for pain and visual disturbance.   Respiratory:  Negative for cough, chest tightness, shortness of breath and wheezing.    Cardiovascular:  Negative for chest pain, palpitations and leg swelling.   Gastrointestinal:  Negative for abdominal pain and blood in stool.   Genitourinary:  Negative for difficulty urinating.   Musculoskeletal:  Positive for arthralgias. Negative for back pain and joint swelling.   Skin:  Negative for rash.   Neurological:  Negative for dizziness, weakness and light-headedness.  "  Psychiatric/Behavioral:  Negative for agitation, behavioral problems, dysphoric mood and sleep disturbance.               Objective   Vital Signs:  /68 (BP Location: Left arm, Patient Position: Sitting, Cuff Size: Adult)   Pulse 61   Ht 165.1 cm (65\")   Wt 77.4 kg (170 lb 11.2 oz)   SpO2 97%   BMI 28.41 kg/m²   Physical Exam  Constitutional:       Appearance: Normal appearance.   HENT:      Head: Normocephalic.      Right Ear: External ear normal.      Left Ear: External ear normal.      Nose: Nose normal.   Eyes:      Pupils: Pupils are equal, round, and reactive to light.   Cardiovascular:      Rate and Rhythm: Normal rate and regular rhythm.      Heart sounds: Normal heart sounds. No murmur heard.     No friction rub. No gallop.   Pulmonary:      Effort: Pulmonary effort is normal.      Breath sounds: Normal breath sounds.   Musculoskeletal:      Cervical back: Normal range of motion.      Comments: OA changes, no inflammatory joint changes or tophi    Skin:     General: Skin is warm and dry.   Neurological:      General: No focal deficit present.      Mental Status: She is alert.   Psychiatric:         Mood and Affect: Mood normal.         Behavior: Behavior normal.         Thought Content: Thought content normal.                       Assessment and Plan       Diagnoses and all orders for this visit:    1. General medical exam (Primary)  Assessment & Plan:  Discussed together health maintenance and screening along with vaccination options and healthy diet and exercise habits as part of the preventative counseling at their physical exam today.     Encouraged diet, exercise, wt loss    Declines DEXA this year - will consider for Jan 2026    Scheduled for 6 month followup dx mammo and ultrasound at [facility]    Encouraged advanced directive - she will get us a copy for chart update       2. Resistant hypertension  Assessment & Plan:  Followup ongoing w/ Cardiology       3. Atypical chest pain  Assessment & " Plan:  Cont current meds and cardiology followup       4. Hypertension, essential  Assessment & Plan:  Continue with regimen adjusted by Cardiology    Followup next week with Cardiology    Fasting labs reviewed     Orders:  -     amLODIPine-valsartan (Exforge)  MG per tablet; Take 1 tablet by mouth Daily.  Dispense: 90 tablet; Refill: 2  -     CBC & Differential; Future  -     Comprehensive Metabolic Panel; Future  -     Lipid Panel; Future  -     TSH; Future  -     T4, Free; Future    5. Hyperlipidemia, mixed  Assessment & Plan:   Lipid abnormalities are improving with treatment    Plan:  Continue same medication/s without change.      Discussed medication dosage, use, side effects, and goals of treatment in detail.    Counseled patient on lifestyle modifications to help control hyperlipidemia.     Patient Treatment Goals:   LDL goal is under 100    Followup at the next regular appointment.    Orders:  -     atorvastatin (LIPITOR) 20 MG tablet; Take 1 tablet by mouth Daily.  Dispense: 90 tablet; Refill: 2  -     CBC & Differential; Future  -     Comprehensive Metabolic Panel; Future  -     Lipid Panel; Future  -     TSH; Future  -     T4, Free; Future    6. Stage 3b chronic kidney disease  Assessment & Plan:  Working on better BP control    Will cont to monitor and discussed nephrology eval if worsening    Orders:  -     CBC & Differential; Future  -     Comprehensive Metabolic Panel; Future  -     Lipid Panel; Future  -     TSH; Future  -     T4, Free; Future    7. Hypothyroidism (acquired)  Assessment & Plan:  Continue Synthroid    Orders:  -     levothyroxine (SYNTHROID, LEVOTHROID) 75 MCG tablet; Take 1 tablet by mouth Daily.  Dispense: 90 tablet; Refill: 2  -     CBC & Differential; Future  -     Comprehensive Metabolic Panel; Future  -     Lipid Panel; Future  -     TSH; Future  -     T4, Free; Future    8. Hyperglycemia  Assessment & Plan:  Continue to work on diet and exercise efforts.  Recent A1c was  5.7    Orders:  -     Hemoglobin A1c; Future    9. Microalbuminuria  Assessment & Plan:  Continue on ARB       10. Vitamin B12 deficiency  Assessment & Plan:  Continues on B12 with folic acid    Orders:  -     folic acid (FOLVITE) 1 MG tablet; Take 1 tablet by mouth Daily.  Dispense: 90 tablet; Refill: 2  -     CBC & Differential; Future  -     Folate; Future  -     Vitamin B12; Future    11. Vitamin D deficiency  Assessment & Plan:  Continue vitamin D    Orders:  -     Vitamin D,25-Hydroxy; Future    12. Glaucoma of both eyes, unspecified glaucoma type  Assessment & Plan:  Doing well after glaucoma sgy in 2023    No longer on drops      13. Acute drug-induced gout involving toe of right foot  Assessment & Plan:  Resolved off hctz      14. Abnormality of right breast on screening mammogram  Assessment & Plan:  Mammogram up-to-date at [facility] June 24, 2024 with need for followup imaging of the R breast - this was completed on 8/22/24 with no findings concerning for breast cancer and plan to recheck a R breast diagnostic mammogram and ultrasound in 6 mos (Feb 2025) - this has been ordered and scheduled.       15. Overweight (BMI 25.0-29.9)  Assessment & Plan:  Patient's (Body mass index is 28.41 kg/m².) indicates that they are overweight with health conditions that include hypertension and dyslipidemias . Weight is improving with lifestyle modifications. BMI is is above average; BMI management plan is completed. We discussed portion control and increasing exercise.             Follow Up   Return in about 6 months (around 8/3/2025) for Med recheck, Fasting labs 1 week before apt (Drink water).  Patient was given instructions and counseling regarding her condition or for health maintenance advice. Please see specific information pulled into the AVS if appropriate.

## 2025-02-03 NOTE — ASSESSMENT & PLAN NOTE
Continue with regimen adjusted by Cardiology    Followup next week with Cardiology    Fasting labs reviewed

## 2025-02-10 ENCOUNTER — OFFICE VISIT (OUTPATIENT)
Dept: CARDIOLOGY | Facility: CLINIC | Age: 80
End: 2025-02-10
Payer: MEDICARE

## 2025-02-10 VITALS
HEART RATE: 63 BPM | BODY MASS INDEX: 27.56 KG/M2 | DIASTOLIC BLOOD PRESSURE: 64 MMHG | SYSTOLIC BLOOD PRESSURE: 160 MMHG | RESPIRATION RATE: 18 BRPM | WEIGHT: 165.4 LBS | HEIGHT: 65 IN | OXYGEN SATURATION: 95 %

## 2025-02-10 DIAGNOSIS — E78.2 HYPERLIPIDEMIA, MIXED: ICD-10-CM

## 2025-02-10 DIAGNOSIS — I50.32 CHRONIC DIASTOLIC CONGESTIVE HEART FAILURE: Primary | ICD-10-CM

## 2025-02-10 DIAGNOSIS — I20.89 ANGINA OF EFFORT: ICD-10-CM

## 2025-02-10 DIAGNOSIS — I1A.0 RESISTANT HYPERTENSION: ICD-10-CM

## 2025-02-10 PROCEDURE — 1159F MED LIST DOCD IN RCRD: CPT | Performed by: INTERNAL MEDICINE

## 2025-02-10 PROCEDURE — 3077F SYST BP >= 140 MM HG: CPT | Performed by: INTERNAL MEDICINE

## 2025-02-10 PROCEDURE — 99214 OFFICE O/P EST MOD 30 MIN: CPT | Performed by: INTERNAL MEDICINE

## 2025-02-10 PROCEDURE — 1160F RVW MEDS BY RX/DR IN RCRD: CPT | Performed by: INTERNAL MEDICINE

## 2025-02-10 PROCEDURE — 3078F DIAST BP <80 MM HG: CPT | Performed by: INTERNAL MEDICINE

## 2025-02-10 RX ORDER — SPIRONOLACTONE 25 MG/1
25 TABLET ORAL DAILY
Qty: 90 TABLET | Refills: 1 | Status: SHIPPED | OUTPATIENT
Start: 2025-02-10

## 2025-02-10 RX ORDER — ISOSORBIDE MONONITRATE 120 MG/1
120 TABLET, EXTENDED RELEASE ORAL DAILY
Qty: 90 TABLET | Refills: 1 | Status: SHIPPED | OUTPATIENT
Start: 2025-02-10

## 2025-02-10 RX ORDER — HYDRALAZINE HYDROCHLORIDE 100 MG/1
100 TABLET, FILM COATED ORAL 3 TIMES DAILY
Qty: 270 TABLET | Refills: 1 | Status: SHIPPED | OUTPATIENT
Start: 2025-02-10

## 2025-02-10 NOTE — ASSESSMENT & PLAN NOTE
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  Heart failure is stable.  NYHA Class II.  Dietary sodium restriction.  Encouraged daily monitoring of the patient's weight.  Regular aerobic exercise.  Medication changes per orders.  Heart failure will be reassessed in 3 months.

## 2025-02-10 NOTE — PROGRESS NOTES
[facility] [place]  [facility] CARDIOLOGY  [address] [place] [address]  Dept: [phone]  Dept Fax: [phone]    Date: 02/10/2025  Patient: Cecilia Crawley  YOB: 1945    Follow Up Office Visit Note    Interval Follow-up  Ms. Cecilia Crawley is a 79 y.o. female who is here for follow-up on resistant hypertension.    Subjective   Patient overall symptoms unchanged.  Complaining neck and bilateral shoulder pains when walking, resolved after few minutes of rest.  Patient had similar pains during her stress test, positional, worsened when lifting her arms above the head for the pictures.  Patient denies orthopnea, PND, palpitations, lightheadedness, syncope or medications side-effects.    The following portions of the patient's history were reviewed and updated as appropriate: allergies, current medications, past family history, past medical history, past social history, past surgical history, and problem list.    Medications:   Allergies   Allergen Reactions    Beta Adrenergic Blockers Arrhythmia    Hydrochlorothiazide Other (See Comments)     gout    Prazosin Myalgia    Sulfa Antibiotics Itching      Current Outpatient Medications   Medication Instructions    amLODIPine-valsartan (Exforge)  MG per tablet 1 tablet, Oral, Daily    aspirin 81 mg, Oral, Daily    atorvastatin (LIPITOR) 20 mg, Oral, Daily    cholecalciferol (VITAMIN D3) 2,000 Units, Oral, Daily    colchicine 0.6 mg, Oral, 2 Times Daily PRN    folic acid (FOLVITE) 1 mg, Oral, Daily    hydrALAZINE (APRESOLINE) 100 mg, Oral, 3 Times Daily, FOR BLOOD PRESSURE    isosorbide mononitrate (IMDUR) 120 mg, Oral, Daily    levothyroxine (SYNTHROID, LEVOTHROID) 75 mcg, Oral, Daily    nitroglycerin (NITROSTAT) 0.4 MG SL tablet 1 under the tongue as needed for angina, may repeat every 5 mins for up three doses.  If no resolution of chest pain, call 911 or head to the nearest emergency room.     "spironolactone (ALDACTONE) 25 mg, Oral, Daily    vitamin B-12 (CYANOCOBALAMIN) 1,000 mcg, Oral, Every Other Day       Tobacco Use: Medium Risk (2/10/2025)    Patient History     Smoking Tobacco Use: Former     Smokeless Tobacco Use: Never     Passive Exposure: Not on file        Objective  Vitals:    02/10/25 1035   BP: 160/64   Pulse: 63   Resp: 18   SpO2: 95%   Weight: 75 kg (165 lb 6.4 oz)   Height: 165.1 cm (65\")   PainSc: 0-No pain      Vitals:    02/10/25 1035   BP: 160/64   Pulse: 63   Resp: 18   SpO2: 95%   Weight: 75 kg (165 lb 6.4 oz)   Height: 165.1 cm (65\")          Physical Exam  Constitutional:       Appearance: Not in distress.   Neck:      Vascular: JVD normal.   Pulmonary:      Breath sounds: Normal breath sounds.   Cardiovascular:      Normal rate. Regular rhythm.      Murmurs: There is no murmur.   Pulses:     Intact distal pulses.   Edema:     Peripheral edema absent.   Neurological:      Mental Status: Alert.              Diagnostic Data  Lab Results   Component Value Date     01/29/2025    K 4.6 01/29/2025     (H) 01/29/2025    CO2 19 (L) 01/29/2025    MG 2.2 11/08/2024    BUN 36 (H) 01/29/2025    CREATININE 1.54 (H) 01/29/2025    CALCIUM 9.5 01/29/2025    BILITOT 0.2 01/29/2025    ALKPHOS 77 01/29/2025    ALT 16 01/29/2025    AST 25 01/29/2025    GLUCOSE 92 01/29/2025    ALBUMIN 4.3 01/29/2025     Lab Results   Component Value Date    WBC 7.4 07/24/2024    HGB 12.3 07/24/2024    HCT 38.0 07/24/2024     07/24/2024     No results found for: \"APTT\", \"INR\", \"PTT\"  No results found for: \"CKTOTAL\", \"TROPONINI\", \"TROPONINT\", \"CKMBINDEX\", \"BNP\"  Lab Results   Component Value Date    PROBNP 203 11/08/2024       Lab Results   Component Value Date    CHLPL 135 01/29/2025    TRIG 167 (H) 01/29/2025    HDL 46 01/29/2025    LDL 61 01/29/2025     Lab Results   Component Value Date    TSH 3.120 01/29/2025    FREET4 1.16 01/29/2025       CV Diagnostics:  Procedures    CXR: No results found " for this or any previous visit.     ECHO/MUGA: Results for orders placed in visit on 10/11/24    Adult Transthoracic Echo Complete W/ Cont if Necessary Per Protocol    Interpretation Summary    The left ventricular cavity is mildly dilated, with normal function. Estimated left ventricular EF = 60% Left ventricular ejection fraction appears to be 56 - 60%.    Left ventricular wall thickness is consistent with mild concentric hypertrophy.    The left atrial cavity is moderately dilated.    Moderate mitral valve regurgitation is present.    Mild aortic valve regurgitation.    Left ventricular diastolic function is consistent with (grade Ia w/high LAP) impaired relaxation.    Estimated right ventricular systolic pressure from tricuspid regurgitation is moderately elevated (45-55 mmHg). Calculated right ventricular systolic pressure from tricuspid regurgitation is 48 mmHg.     STRESS TESTS: Results for orders placed in visit on 12/09/24    Stress Test With Myocardial Perfusion One Day     CARDIAC CATH: No results found for this or any previous visit.     DEVICES: No valid procedures specified.   HOLTER: No results found for this or any previous visit.     CT/MRI:  No results found for this or any previous visit.    VASCULAR: No valid procedures specified.     Assessment and Plan  Diagnoses and all orders for this visit:    1. Chronic diastolic congestive heart failure (Primary)  Assessment & Plan:  Congestive heart failure due to heart valve disease and hypertension.  Heart failure is stable.  NYHA Class II.  Dietary sodium restriction.  Encouraged daily monitoring of the patient's weight.  Regular aerobic exercise.  Medication changes per orders.  Heart failure will be reassessed in 3 months.        Orders:  -     Comprehensive Metabolic Panel; Future  -     Magnesium; Future  -     proBNP; Future    2. Resistant hypertension  Assessment & Plan:  Hypertension is uncontrolled at home and in office readings.  Renal artery  ultrasound negative.  Consider secondary hypertension blood work next visit if blood pressure not to target.  Uptitrate spironolactone to 25 mg p.o. daily.  Uptitrate isosorbide mononitrate to 120 mg p.o. daily.  Uptitrate hydralazine to 100 mg p.o. 3 times daily.  Dietary sodium restriction.  Weight loss.  Regular aerobic exercise.  Ambulatory blood pressure monitoring.  Labs in 6 weeks.  Blood pressure will be reassessedin 3 months.    Orders:  -     spironolactone (ALDACTONE) 25 MG tablet; Take 1 tablet by mouth Daily.  Dispense: 90 tablet; Refill: 1  -     hydrALAZINE (APRESOLINE) 100 MG tablet; Take 1 tablet by mouth 3 (Three) Times a Day. FOR BLOOD PRESSURE  Dispense: 270 tablet; Refill: 1  -     isosorbide mononitrate (IMDUR) 120 MG 24 hr tablet; Take 1 tablet by mouth Daily.  Dispense: 90 tablet; Refill: 1    3. Angina of effort  Assessment & Plan:  Anginal pains worsened by lifting heavy objects objects/above shoulder level, currently occurring on mild to moderate intensity activities.  Relieved by rest.  Exam normal. EKG normal. Echocardiogram suggesting heart failure preserved ejection fraction, with normal EF, moderate LAE, moderate MR, grade 1 diastolic dysfunction with high filling pressures, moderate pulmonary hypertension.  Nuclear stress test negative.  Renal artery Doppler ultrasound negative.  Plan:  Aggressive blood pressure control as above  Uptitrate isosorbide mononitrate to 120 mg p.o. daily for angina, hydralazine 100 mg p.o. 3 times daily and 3 lactone 25 mg p.o. daily for blood pressure and CHF  Continue aspirin 81 mg p.o. daily and nitroglycerin sublingual as needed for chest pain for now  Patient counseled in regards to heart healthy, low fat/ low cholesterol/low sat diet, daily exercise for 30 minutes, low to moderate intensity, and weight loss.      4. Hyperlipidemia, mixed  Assessment & Plan:   Lipid abnormalities are improving with treatment    Plan:  Continue same medication/s  without change.  Atorvastatin 20 mg p.o. daily    Discussed medication dosage, use, side effects, and goals of treatment in detail.    Counseled patient on lifestyle modifications to help control hyperlipidemia.   Advised patient to exercise for 150 minutes weekly. (30 minute brisk walk, 5 days a week for example)  Weight Loss encouraged    Lab Results   Component Value Date    CHLPL 135 01/29/2025    CHLPL 159 07/24/2024    CHLPL 197 01/22/2024    TRIG 167 (H) 01/29/2025    TRIG 131 07/24/2024    TRIG 220 (H) 01/22/2024    HDL 46 01/29/2025    HDL 59 07/24/2024    HDL 55 01/22/2024    LDL 61 01/29/2025    LDL 77 07/24/2024     (H) 01/22/2024     Goal of therapy: LDL  mg/dL      Followup in 6 months.           Return in about 3 months (around 5/10/2025) for Follow-up with Dr Parra.    There are no Patient Instructions on file for this visit.    Kris Parra MD

## 2025-02-10 NOTE — ASSESSMENT & PLAN NOTE
Lipid abnormalities are improving with treatment    Plan:  Continue same medication/s without change.  Atorvastatin 20 mg p.o. daily    Discussed medication dosage, use, side effects, and goals of treatment in detail.    Counseled patient on lifestyle modifications to help control hyperlipidemia.   Advised patient to exercise for 150 minutes weekly. (30 minute brisk walk, 5 days a week for example)  Weight Loss encouraged    Lab Results   Component Value Date    CHLPL 135 01/29/2025    CHLPL 159 07/24/2024    CHLPL 197 01/22/2024    TRIG 167 (H) 01/29/2025    TRIG 131 07/24/2024    TRIG 220 (H) 01/22/2024    HDL 46 01/29/2025    HDL 59 07/24/2024    HDL 55 01/22/2024    LDL 61 01/29/2025    LDL 77 07/24/2024     (H) 01/22/2024     Goal of therapy: LDL  mg/dL      Followup in 6 months.

## 2025-02-10 NOTE — ASSESSMENT & PLAN NOTE
Hypertension is uncontrolled at home and in office readings.  Renal artery ultrasound negative.  Consider secondary hypertension blood work next visit if blood pressure not to target.  Uptitrate spironolactone to 25 mg p.o. daily.  Uptitrate isosorbide mononitrate to 120 mg p.o. daily.  Uptitrate hydralazine to 100 mg p.o. 3 times daily.  Dietary sodium restriction.  Weight loss.  Regular aerobic exercise.  Ambulatory blood pressure monitoring.  Labs in 6 weeks.  Blood pressure will be reassessedin 3 months.

## 2025-02-10 NOTE — ASSESSMENT & PLAN NOTE
Anginal pains worsened by lifting heavy objects objects/above shoulder level, currently occurring on mild to moderate intensity activities.  Relieved by rest.  Exam normal. EKG normal. Echocardiogram suggesting heart failure preserved ejection fraction, with normal EF, moderate LAE, moderate MR, grade 1 diastolic dysfunction with high filling pressures, moderate pulmonary hypertension.  Nuclear stress test negative.  Renal artery Doppler ultrasound negative.  Plan:  Aggressive blood pressure control as above  Uptitrate isosorbide mononitrate to 120 mg p.o. daily for angina, hydralazine 100 mg p.o. 3 times daily and 3 lactone 25 mg p.o. daily for blood pressure and CHF  Continue aspirin 81 mg p.o. daily and nitroglycerin sublingual as needed for chest pain for now  Patient counseled in regards to heart healthy, low fat/ low cholesterol/low sat diet, daily exercise for 30 minutes, low to moderate intensity, and weight loss.

## 2025-02-17 ENCOUNTER — TELEPHONE (OUTPATIENT)
Dept: CARDIOLOGY | Facility: CLINIC | Age: 80
End: 2025-02-17
Payer: MEDICARE

## 2025-02-17 NOTE — TELEPHONE ENCOUNTER
This morning b/p was 160/75 p 55 pt is  taking her medication  . Pt blood pressure at 4:41 pm   190/81 p79  . yesterday her blood pressure was 156/68. That was at 3:50 in the afternoon . Pt said it has been running high everyday . Pt is having weakness and dizziness 1 bottle oz water , 2 diet soda 20 oz . Is what she is drinking .  NO CHEST , NO SOB Please advise?

## 2025-02-17 NOTE — TELEPHONE ENCOUNTER
Caller: Cecilia Crawley    Relationship: Self    Best call back number: [phone]      Which medication are you concerned about: BLOOD PRESSURE MEDICATION INCREASES PATIENT NOT SURE WHICH MEDS    Who prescribed you this medication: DR. ARREOLA    When did you start taking this medication: LAST WEEK    What are your concerns: BLOOD PRESSURE IS STILL UP BUT THE MEDICATION CHANGES ARE MAKING PATIENT WEAK.    How long have you had these concerns: 2-13-25

## 2025-02-20 ENCOUNTER — OUTSIDE FACILITY SERVICE (OUTPATIENT)
Dept: CARDIOLOGY | Facility: CLINIC | Age: 80
End: 2025-02-20
Payer: MEDICARE

## 2025-02-20 NOTE — TELEPHONE ENCOUNTER
Caller: Cecilia Crawley    Relationship: Self    Best call back number: [phone]    What is the best time to reach you: ANY    Who are you requesting to speak with (clinical staff, provider,  specific staff member): CLINICAL    What was the call regarding: PATIENT CALLED BACK AGAIN REGARDING THE MEDICATION ISSUES SHE IS HAVING. PLEASE CALL HER BACK TO DISCUSS AS SOON AS POSSIBLE. THANK YOU    Is it okay if the provider responds through [employer]: PLEASE CALL ASAP

## 2025-02-20 NOTE — TELEPHONE ENCOUNTER
Spoke with patient- expressed Dr Parra recommendations that patient be evaluated at ED r/t elevated BP with dizziness and weakness. Patient verbalized understanding and reports she will go to ED

## 2025-02-21 ENCOUNTER — OUTSIDE FACILITY SERVICE (OUTPATIENT)
Dept: CARDIOLOGY | Facility: CLINIC | Age: 80
End: 2025-02-21
Payer: MEDICARE

## 2025-02-21 PROCEDURE — 99214 OFFICE O/P EST MOD 30 MIN: CPT | Performed by: INTERNAL MEDICINE

## 2025-02-21 PROCEDURE — 93306 TTE W/DOPPLER COMPLETE: CPT | Performed by: INTERNAL MEDICINE

## 2025-02-24 ENCOUNTER — TELEPHONE (OUTPATIENT)
Dept: FAMILY MEDICINE CLINIC | Facility: CLINIC | Age: 80
End: 2025-02-24
Payer: MEDICARE

## 2025-02-24 ENCOUNTER — CLINICAL SUPPORT (OUTPATIENT)
Dept: CARDIOLOGY | Facility: CLINIC | Age: 80
End: 2025-02-24
Payer: MEDICARE

## 2025-02-24 DIAGNOSIS — R42 DIZZINESS: Primary | ICD-10-CM

## 2025-02-24 NOTE — TELEPHONE ENCOUNTER
Patient walked in says that she was inpatient at [facility] at the [facility] 02/20/25 and released on 02/21/2025.  Says that she was told to check in with pcp.  Says that she had her mammogram today and is following up with her cardiologist and currently wearing a halter monitor.  She says that she will come in for an appointment but wanted to ask if she needed to?

## 2025-02-25 ENCOUNTER — READMISSION MANAGEMENT (OUTPATIENT)
Dept: CALL CENTER | Facility: HOSPITAL | Age: 80
End: 2025-02-25
Payer: MEDICARE

## 2025-02-25 ENCOUNTER — TRANSITIONAL CARE MANAGEMENT TELEPHONE ENCOUNTER (OUTPATIENT)
Dept: CALL CENTER | Facility: HOSPITAL | Age: 80
End: 2025-02-25
Payer: MEDICARE

## 2025-02-25 NOTE — OUTREACH NOTE
Call Center TCM Note      Flowsheet Row Responses   [facility] patient discharged from? Non-  [[facility] [place]]   Does the patient have one of the following disease processes/diagnoses(primary or secondary)? Other   TCM attempt successful? No   Unsuccessful attempts Attempt 1   Call Status Left message            Luis Ocasio RN    2/25/2025, 16:30 EST

## 2025-02-25 NOTE — TELEPHONE ENCOUNTER
CALLED PATIENT AND LEFT A VOICEMAIL TO CALL BACK TO GET A HOSPITAL FOLLOW UP SCHEDULED AND IT HUNG UP ON  THE MIDDLE OF ME LEAVING A VOICEMAIL    HUB TO RELAY:    PATIENT NEEDS A HOSPITAL FOLLOW UP SCHEDULED CAN BE WITH DANNA RAO

## 2025-02-25 NOTE — OUTREACH NOTE
Prep Survey      Flowsheet Row Responses   [facility] discharged from? Non-[facility]   Is LACE score < 7 ? Non- Discharge   Eligibility [facility]   Date of Discharge 02/21/25   Discharge diagnosis DIZZY/ LIGHTHEADED   Does the patient have one of the following disease processes/diagnoses(primary or secondary)? Other   Prep survey completed? Yes            Lolita CONROY - Registered Nurse

## 2025-02-25 NOTE — OUTREACH NOTE
Call Center TCM Note      Flowsheet Row Responses   [facility] [place]  [place] [place] [facility] [place]   Does the patient have one of the following disease processes/diagnoses(primary or secondary)? Other   TCM attempt successful? Yes   Call start time 1654   Call end time 1702   Discharge diagnosis DIZZY/ LIGHTHEADED   Person spoke with today (if not patient) and relationship Patient   Meds reviewed with patient/caregiver? Yes   Is the patient having any side effects they believe may be caused by any medication additions or changes? No   Does the patient have all medications ordered at discharge? Yes   Is the patient taking all medications as directed (includes completed medication regime)? Yes   Comments Patient has a hospital followup scheduled on 2/26/2025 with PCP office.   Does the patient have an appointment with their PCP within 7-14 days of discharge? Yes   Psychosocial issues? No   Did the patient receive a copy of their discharge instructions? Yes   Nursing interventions Reviewed instructions with patient   What is the patient's perception of their health status since discharge? Improving   Is the patient/caregiver able to teach back signs and symptoms related to disease process for when to call PCP? Yes   Is the patient/caregiver able to teach back signs and symptoms related to disease process for when to call 911? Yes   Is the patient/caregiver able to teach back the hierarchy of who to call/visit for symptoms/problems? PCP, Specialist, Home health nurse, Urgent Care, ED, 911 Yes   If the patient is a current smoker, are they able to teach back resources for cessation? Not a smoker   TCM call completed? Yes   Wrap up additional comments Patient is monitoring her blood pressure closely. She is obtaining some high readings at home and will bring her arm cuff tomorrow to compare. She is also wearing a heart Monitor.   Call end time 1702   Would this patient benefit from a Referral to [facility]?  No   Is the patient interested in additional calls from an ambulatory ? No            Luis Ocasio RN    2/25/2025, 17:03 EST

## 2025-02-26 ENCOUNTER — OFFICE VISIT (OUTPATIENT)
Dept: FAMILY MEDICINE CLINIC | Facility: CLINIC | Age: 80
End: 2025-02-26
Payer: MEDICARE

## 2025-02-26 VITALS
DIASTOLIC BLOOD PRESSURE: 66 MMHG | HEIGHT: 65 IN | HEART RATE: 68 BPM | BODY MASS INDEX: 27.16 KG/M2 | WEIGHT: 163 LBS | SYSTOLIC BLOOD PRESSURE: 152 MMHG | OXYGEN SATURATION: 95 % | RESPIRATION RATE: 18 BRPM

## 2025-02-26 DIAGNOSIS — Z09 HOSPITAL DISCHARGE FOLLOW-UP: Primary | ICD-10-CM

## 2025-02-26 DIAGNOSIS — I1A.0 RESISTANT HYPERTENSION: ICD-10-CM

## 2025-02-26 PROCEDURE — 1126F AMNT PAIN NOTED NONE PRSNT: CPT

## 2025-02-26 PROCEDURE — 3077F SYST BP >= 140 MM HG: CPT

## 2025-02-26 PROCEDURE — 1111F DSCHRG MED/CURRENT MED MERGE: CPT

## 2025-02-26 PROCEDURE — 99495 TRANSJ CARE MGMT MOD F2F 14D: CPT

## 2025-02-26 PROCEDURE — 3078F DIAST BP <80 MM HG: CPT

## 2025-02-26 PROCEDURE — 1159F MED LIST DOCD IN RCRD: CPT

## 2025-02-26 PROCEDURE — 1160F RVW MEDS BY RX/DR IN RCRD: CPT

## 2025-02-26 NOTE — PROGRESS NOTES
Transitional Care Follow Up Visit  Subjective     Cecilia Wanda Crawley is a 79 y.o. female who presents for a transitional care management visit.    Within 48 business hours after discharge our office contacted her via telephone to coordinate her care and needs.      I reviewed and discussed the details of that call along with the discharge summary, hospital problems, inpatient lab results, inpatient diagnostic studies, and consultation reports with Cecilia.     Current outpatient and discharge medications have been reconciled for the patient.  Reviewed by: ANAHY Silva    TCM phone call was completed yesterday 2/25/2025 Transitional Care Management Telephone Encounter with Luis Ocasio RN (02/25/2025)       2/25/2025     9:45 AM   Date of TCM Phone Call   [facility]   Date of Discharge 2/21/2025     Risk for Readmission (LACE) No data recorded    History of Present Illness  Patient presents the office today for a hospital follow-up.  She was admitted to [facility] from 2/20/2025 and was discharged on 2/21/2025.  She presented to the emergency room complaining of generalized weakness for about a week prior.  Her blood pressure medications had been adjusted a couple days prior.  She reports that the dose for her hydralazine, isosorbide and spironolactone were doubled by her cardiologist.  It was noted that she had acute kidney injury on arrival to the ED as well.  Upon discharge, she remains on amlodipine-valsartan  mg daily, isosorbide mononitrate 120 mg daily, spironolactone 25 mg daily, and her hydralazine was decreased to 50 mg 3 times daily.  A Zio patch monitor was placed at her cardiology office.      She states that she is doing okay.  She is going to follow-up with her cardiologist after her heart monitor is removed.  Per hospitalist documentation, it was recommended for her to make a cardiology follow-up within 1 to 2 weeks from  "discharge.  She was not aware of this.  She denies any dizziness, chest pain, shortness of breath.  She states that she just feels bad.  She reports that she thinks that her medications were increased too fast and all at once.  She is taking all of her medications in the morning.  She has had to split them up in the past.  Previously she was taking her amlodipine-valsartan and isosorbide at night.  She provided a blood pressure log today.  Her blood pressures range from 130-177/50-86. Heart rate is in the 50's which is her baseline.   Objective   Vital Signs:   /66 (BP Location: Left arm, Patient Position: Sitting, Cuff Size: Adult)   Pulse 68   Resp 18   Ht 165.1 cm (65\")   Wt 73.9 kg (163 lb)   SpO2 95%   BMI 27.12 kg/m²     Body mass index is 27.12 kg/m².    Review of Systems   Constitutional:  Positive for fatigue.   Respiratory:  Negative for shortness of breath.    Cardiovascular:  Negative for chest pain, palpitations and leg swelling.   Neurological:  Negative for dizziness, weakness and headache.       Past History:  Medical History: has a past medical history of Hypertension.   Surgical History: has a past surgical history that includes Cataract extraction and Eye surgery.   Family History: family history includes Breast cancer in her mother; Hypertension in her mother.   Social History: reports that she quit smoking about 27 years ago. Her smoking use included cigarettes. She started smoking about 67 years ago. She has a 20 pack-year smoking history. She has never used smokeless tobacco. She reports that she does not currently use alcohol. She reports that she does not use drugs.      Current Outpatient Medications:     amLODIPine-valsartan (Exforge)  MG per tablet, Take 1 tablet by mouth Daily., Disp: 90 tablet, Rfl: 2    aspirin 81 MG EC tablet, Take 1 tablet by mouth Daily., Disp: 90 tablet, Rfl: 3    atorvastatin (LIPITOR) 20 MG tablet, Take 1 tablet by mouth Daily., Disp: 90 tablet, " Rfl: 2    cholecalciferol (VITAMIN D3) 25 MCG (1000 UT) tablet, Take 2 tablets by mouth Daily., Disp: , Rfl:     colchicine 0.6 MG tablet, Take 1 tablet by mouth 2 (Two) Times a Day As Needed for Muscle / Joint Pain (gout flareup)., Disp: 30 tablet, Rfl: 3    folic acid (FOLVITE) 1 MG tablet, Take 1 tablet by mouth Daily., Disp: 90 tablet, Rfl: 2    hydrALAZINE (APRESOLINE) 100 MG tablet, Take 1 tablet by mouth 3 (Three) Times a Day. FOR BLOOD PRESSURE (Patient taking differently: Take 0.5 tablets by mouth 3 (Three) Times a Day. FOR BLOOD PRESSURE), Disp: 270 tablet, Rfl: 1    isosorbide mononitrate (IMDUR) 120 MG 24 hr tablet, Take 1 tablet by mouth Daily., Disp: 90 tablet, Rfl: 1    levothyroxine (SYNTHROID, LEVOTHROID) 75 MCG tablet, Take 1 tablet by mouth Daily., Disp: 90 tablet, Rfl: 2    nitroglycerin (NITROSTAT) 0.4 MG SL tablet, 1 under the tongue as needed for angina, may repeat every 5 mins for up three doses.  If no resolution of chest pain, call 911 or head to the nearest emergency room., Disp: 25 tablet, Rfl: 11    spironolactone (ALDACTONE) 25 MG tablet, Take 1 tablet by mouth Daily., Disp: 90 tablet, Rfl: 1    vitamin B-12 (CYANOCOBALAMIN) 1000 MCG tablet, Take 1 tablet by mouth Every Other Day., Disp: , Rfl:     Allergies: Beta adrenergic blockers, Hydrochlorothiazide, Prazosin, and Sulfa antibiotics    Physical Exam  Vitals and nursing note reviewed.   Constitutional:       General: She is not in acute distress.     Appearance: She is not ill-appearing or toxic-appearing.   HENT:      Head: Normocephalic and atraumatic.   Cardiovascular:      Rate and Rhythm: Normal rate and regular rhythm.      Pulses: Normal pulses.      Heart sounds: Normal heart sounds. No murmur heard.  Pulmonary:      Effort: Pulmonary effort is normal. No respiratory distress.      Breath sounds: Normal breath sounds. No wheezing, rhonchi or rales.   Skin:     General: Skin is warm.   Neurological:      General: No focal  deficit present.      Mental Status: She is alert and oriented to person, place, and time. Mental status is at baseline.   Psychiatric:         Mood and Affect: Mood normal.          Result Review :            Transitional Care Management Telephone Encounter with Luis Ocasio RN (02/25/2025)     Extensive review of hospital discharge summary from [facility] on 2/20/2025 was reviewed       Assessment and Plan    Diagnoses and all orders for this visit:    1. Hospital discharge follow-up (Primary)    2. Resistant hypertension    Stable.  She is not ill-appearing.  She is not bradycardic today.  She is not tachypneic or hypoxic.  She is alert and oriented.  She answers questions appropriately.  Her blood pressure in office today was 152/66.  She denies chest pain, shortness of breath, lower extremity edema, headaches or visual disturbances.  We discussed that recommendation is for her to make an appointment with her cardiologist as this was recommended in discharge summary.  She is concerned that her only follow-up is for the Zio patch.  Recommend that after her visit today, she swing by her cardiologist office next-door to see about getting an appointment within the next week as she is still feeling bad despite the changes to her blood pressure medication.  She verbalized understanding of this.  We did discuss that she likely needs some alterations to her medication regimen.  However, hesitant to make these changes today as we do not want her blood pressure high but also do not want her feeling bad.    Recommend that she take her amlodipine-valsartan as well as her isosorbide at bedtime like how she used to.  Discussed that her symptoms may be due to taking so many blood pressure medications all at once.  Ultimately, recommend that she evaluate this further with her cardiologist.    We discussed the importance of checking her blood pressure more frequently at home.  Recommend that she sit  upright with feet flat on the floor for at least 5 minutes at rest prior to checking.  She brought her cuff with her to the office and this was compared to blood pressure that was obtained manually which were similar.  Recommend if her blood pressure is consistently staying greater than 140/90, she notify her cardiologist.    Patient is agreeable to this and verbalized understanding of plan of care.    Follow Up   Return for Next scheduled follow up.  Patient was given instructions and counseling regarding her condition or for health maintenance advice. Please see specific information pulled into the AVS if appropriate.     Anna Reyna, APRN

## 2025-03-04 ENCOUNTER — CLINICAL SUPPORT (OUTPATIENT)
Dept: CARDIOLOGY | Facility: CLINIC | Age: 80
End: 2025-03-04
Payer: MEDICARE

## 2025-03-04 VITALS — SYSTOLIC BLOOD PRESSURE: 158 MMHG | DIASTOLIC BLOOD PRESSURE: 64 MMHG

## 2025-03-04 DIAGNOSIS — I1A.0 RESISTANT HYPERTENSION: ICD-10-CM

## 2025-03-04 RX ORDER — HYDRALAZINE HYDROCHLORIDE 100 MG/1
100 TABLET, FILM COATED ORAL 3 TIMES DAILY
Qty: 270 TABLET | Refills: 1 | Status: SHIPPED | OUTPATIENT
Start: 2025-03-04

## 2025-03-04 RX ORDER — SPIRONOLACTONE 25 MG/1
12.5 TABLET ORAL DAILY
Qty: 90 TABLET | Refills: 1 | Status: SHIPPED | OUTPATIENT
Start: 2025-03-04

## 2025-03-04 NOTE — Clinical Note
Can you please schedule patient in 1 week for in person BP check with orthostatic maneuvers.  Thank you!

## 2025-03-04 NOTE — PROGRESS NOTES
Patient was not feeling very well still dizzy.  Sitting down asymptomatic, symptoms started when stands up and walk around.  Decrease spironolactone to 12.5 mg p.o. daily.  Change isosorbide to the evening as well as spironolactone.  Continue amlodipine - valsartan in the morning.  Patient to keep monitoring her blood pressure as she has been doing.  Follow-up in 1 week in person with orthostatic maneuvers.

## 2025-03-11 ENCOUNTER — CLINICAL SUPPORT (OUTPATIENT)
Dept: CARDIOLOGY | Facility: CLINIC | Age: 80
End: 2025-03-11
Payer: MEDICARE

## 2025-03-11 ENCOUNTER — TELEPHONE (OUTPATIENT)
Dept: CARDIOLOGY | Facility: CLINIC | Age: 80
End: 2025-03-11
Payer: MEDICARE

## 2025-03-11 NOTE — PROGRESS NOTES
"Pt came into office for orthostatic bp check  While laying 188/79 HR 48  Sittin/ 68 HR 50  Standin/64 HR 53.  Pt states her blood pressure is \"all over the place at home\" ranging from 190's/50's.   She denies any sob/ chest pain/ dizziness.   Sent message to Dr. HUDSON to inform him.   "

## 2025-03-11 NOTE — TELEPHONE ENCOUNTER
"Pt came into office for orthostatic bp check.    While laying bp 188/79 HR 48  Sittin/68 HR 50  Standin/64 HR 53    Pt states her blood pressures at home are \"all over the place\" ranging from 150's/190's to 80's/60's. She denies any SOB/ chest pain/ dizziness.   Please advise?   "

## 2025-03-20 ENCOUNTER — RESULTS FOLLOW-UP (OUTPATIENT)
Dept: CARDIOLOGY | Facility: CLINIC | Age: 80
End: 2025-03-20

## 2025-03-24 ENCOUNTER — CLINICAL SUPPORT (OUTPATIENT)
Dept: CARDIOLOGY | Facility: CLINIC | Age: 80
End: 2025-03-24
Payer: MEDICARE

## 2025-03-24 DIAGNOSIS — I50.32 CHRONIC DIASTOLIC CONGESTIVE HEART FAILURE: ICD-10-CM

## 2025-03-24 PROCEDURE — 36415 COLL VENOUS BLD VENIPUNCTURE: CPT | Performed by: INTERNAL MEDICINE

## 2025-03-24 NOTE — PROGRESS NOTES
Chief Complaint  Hypertension, Hyperlipidemia, Hypothyroidism, Hyperglycemia    Subjective    History of Present Illness:  Cecilia Crawley is a 79 y.o. female who presents today for 6-month follow-up visit and medication refills.    She has been doing well since our last visit together in Jan for her medicare annual wellness visit.    BP has been higher with home readings despite change to exforge last visit.  Baseline bradycardia and she has had problems with b-blockers in the past and CKD - Stage IIIb.  Willing to add hydralazine.    She did have a flareup with gout in her right great toe and uric acid level did return elevated above 9 in the past.  We did stop her HCTZ.  She has continued with Norvasc and olmesartan and had good BP checks off hctz at home.  She does have colchicine to use for flareups as needed.  Repeat uric acid returned significantly improved off HCTZ (down from 9.7 to 7.3). No gout flareups off HCTZ!      She did have blood work done before today's appointment and it showed ongoing stage IIIb chronic kidney disease related to hypertension and diabetes.      She does understand the need to avoid NSAIDs given chronic kidney disease.      She did have a microalbumin test at her Medicare wellness visit in January that returned positive.  She does continue on her ARB for hypertension which also helps with renal protection.     She continues to work hard on diet and exercise efforts given concerns for prediabetes.     Her last A1c was stable at 5.8!.       Continues with ophthalmology follow-up for glaucoma.  Underwent glaucoma sgy in 2023 and is off drops!  Good diabetic eye checkup Jan 2024.      On B12 and folic acid along with vitamin D given past history of vitamin deficiencies.     Continues atorvastatin for hyperlipidemia.  Her most recent lipid panel done last week showed LDL at 77.     On Synthroid for hypothyroidism with no missed doses.  Recent thyroid studies returned normal with blood  "work done last week.     She reports she does have an advance directive and will get us a copy for chart update.     DEXA up-to-date for osteoporosis screening January 2023.  It returned normal.  Plan to repeat in January 2025.       Mammogram up-to-date at [facility] June 24, 2024 with need for followup imaging of the R breast - this is scheduled at [facility]         Objective   Vital Signs:   /62   Pulse 52   Ht 165.1 cm (65\")   Wt 75.8 kg (167 lb)   SpO2 96%   BMI 27.79 kg/m²     Review of Systems   Constitutional:  Negative for appetite change, chills and fever.   HENT:  Negative for hearing loss.    Eyes:  Negative for blurred vision.   Respiratory:  Negative for chest tightness.    Cardiovascular:  Negative for chest pain.   Gastrointestinal:  Negative for abdominal pain.   Musculoskeletal:  Negative for gait problem.   Skin:  Negative for rash.   Neurological:  Positive for light-headedness.   Psychiatric/Behavioral:  Negative for depressed mood.        Past History:  Medical History: has a past medical history of Hypertension.   Surgical History: has a past surgical history that includes Cataract extraction and Eye surgery.   Family History: family history is not on file.   Social History: reports that she quit smoking about 26 years ago. Her smoking use included cigarettes. She started smoking about 66 years ago. She has a 20 pack-year smoking history. She has never used smokeless tobacco. She reports that she does not currently use alcohol. She reports that she does not use drugs.      Current Outpatient Medications:     amLODIPine-valsartan (Exforge)  MG per tablet, Take 1 tablet by mouth Daily., Disp: 90 tablet, Rfl: 2    atorvastatin (LIPITOR) 20 MG tablet, Take 1 tablet by mouth Daily., Disp: 90 tablet, Rfl: 2    cholecalciferol (VITAMIN D3) 25 MCG (1000 UT) tablet, Take 2 tablets by mouth Daily., Disp: , Rfl:     colchicine 0.6 MG tablet, Take 1 tablet by mouth 2 " (Two) Times a Day As Needed for Muscle / Joint Pain (gout flareup)., Disp: 30 tablet, Rfl: 3    folic acid (FOLVITE) 1 MG tablet, Take 1 tablet by mouth Daily., Disp: 90 tablet, Rfl: 2    levothyroxine (SYNTHROID, LEVOTHROID) 75 MCG tablet, Take 1 tablet by mouth Daily., Disp: 90 tablet, Rfl: 2    polyethyl glycol-propyl glycol (SYSTANE) 0.4-0.3 % solution ophthalmic solution (artificial tears), Administer 2 drops to both eyes Every 1 (One) Hour As Needed., Disp: , Rfl:     vitamin B-12 (CYANOCOBALAMIN) 1000 MCG tablet, Take 1 tablet by mouth Every Other Day., Disp: , Rfl:     hydrALAZINE (APRESOLINE) 25 MG tablet, Take 1 tablet by mouth 3 (Three) Times a Day. FOR BLOOD PRESSURE, Disp: 90 tablet, Rfl: 1    Allergies: Beta adrenergic blockers, Hydrochlorothiazide, Prazosin, and Sulfa antibiotics    Physical Exam  Constitutional:       Appearance: Normal appearance.   HENT:      Head: Normocephalic.      Right Ear: External ear normal.      Left Ear: External ear normal.      Nose: Nose normal.   Eyes:      Pupils: Pupils are equal, round, and reactive to light.   Cardiovascular:      Rate and Rhythm: Normal rate and regular rhythm.      Heart sounds: Normal heart sounds.   Pulmonary:      Effort: Pulmonary effort is normal.      Breath sounds: Normal breath sounds.   Musculoskeletal:         General: Normal range of motion.      Cervical back: Normal range of motion.   Skin:     General: Skin is warm and dry.   Neurological:      General: No focal deficit present.      Mental Status: She is alert.   Psychiatric:         Mood and Affect: Mood normal.         Behavior: Behavior normal.         Thought Content: Thought content normal.          Result Review                   Assessment and Plan  Diagnoses and all orders for this visit:    1. Hypertension, essential (Primary)  Assessment & Plan:  Discussed BP elevation here and home readings    We will add hydralazine given her baseline bradycardia and CKD.  Cont  exforge.  Recheck in 4-6 weeks or sooner if problems arise     Orders:  -     amLODIPine-valsartan (Exforge)  MG per tablet; Take 1 tablet by mouth Daily.  Dispense: 90 tablet; Refill: 2  -     hydrALAZINE (APRESOLINE) 25 MG tablet; Take 1 tablet by mouth 3 (Three) Times a Day. FOR BLOOD PRESSURE  Dispense: 90 tablet; Refill: 1  -     Comprehensive Metabolic Panel; Future  -     Lipid Panel; Future  -     TSH; Future  -     T4, Free; Future    2. Hyperlipidemia, mixed  Assessment & Plan:   Lipid abnormalities are improving with treatment    Plan:  Continue same medication/s without change.      Discussed medication dosage, use, side effects, and goals of treatment in detail.    Counseled patient on lifestyle modifications to help control hyperlipidemia.     Patient Treatment Goals:   LDL goal is under 100    Followup at the next regular appointment.    Orders:  -     atorvastatin (LIPITOR) 20 MG tablet; Take 1 tablet by mouth Daily.  Dispense: 90 tablet; Refill: 2  -     Comprehensive Metabolic Panel; Future  -     Lipid Panel; Future  -     TSH; Future  -     T4, Free; Future    3. Hyperglycemia  Assessment & Plan:  Continue to work on diet and exercise efforts.  Recent A1c was 5.8.    Orders:  -     Hemoglobin A1c; Future    4. Hypothyroidism (acquired)  Assessment & Plan:  Continue Synthroid    Orders:  -     levothyroxine (SYNTHROID, LEVOTHROID) 75 MCG tablet; Take 1 tablet by mouth Daily.  Dispense: 90 tablet; Refill: 2  -     Comprehensive Metabolic Panel; Future  -     Lipid Panel; Future  -     TSH; Future  -     T4, Free; Future    5. Stage 3b chronic kidney disease  Assessment & Plan:  Working on better BP control    Will cont to monitor and discussed nephrology eval if worsening    Orders:  -     Comprehensive Metabolic Panel; Future  -     Lipid Panel; Future  -     TSH; Future  -     T4, Free; Future    6. Microalbuminuria  Assessment & Plan:  Continue on ARB       7. Vitamin B12  deficiency  Assessment & Plan:  Continues on B12 with folic acid    Orders:  -     vitamin B-12 (CYANOCOBALAMIN) 1000 MCG tablet; Take 1 tablet by mouth Every Other Day.  -     folic acid (FOLVITE) 1 MG tablet; Take 1 tablet by mouth Daily.  Dispense: 90 tablet; Refill: 2    8. Vitamin D deficiency  Assessment & Plan:  Continue vitamin D    Orders:  -     cholecalciferol (VITAMIN D3) 25 MCG (1000 UT) tablet; Take 2 tablets by mouth Daily.    9. Overweight (BMI 25.0-29.9)  Assessment & Plan:  Patient's (Body mass index is 27.79 kg/m².) indicates that they are overweight with health conditions that include hypertension and dyslipidemias . Weight is improving with lifestyle modifications. BMI is is above average; BMI management plan is completed. We discussed portion control and increasing exercise.                      Follow Up  Return in 6 months (on 1/30/2025) for Medicare Wellness, Med recheck, Fasting labs 1 week before apt (Drink water).    Kalyan Rocha MD   Imaging Studies/Medications

## 2025-03-24 NOTE — PROGRESS NOTES
Venipuncture Blood Specimen Collection  Venipuncture performed in clinic by Ashanti Harkins RN with good hemostasis. Patient tolerated the procedure well without complications.   03/24/25   Ashanti Harkins RN

## 2025-03-25 LAB
ALBUMIN SERPL-MCNC: 4.1 G/DL (ref 3.7–4.7)
ALP SERPL-CCNC: 62 IU/L (ref 44–121)
ALT SERPL-CCNC: 18 IU/L (ref 0–32)
AST SERPL-CCNC: 23 IU/L (ref 0–40)
BILIRUB SERPL-MCNC: 0.2 MG/DL (ref 0–1.2)
BUN SERPL-MCNC: 30 MG/DL (ref 8–27)
BUN/CREAT SERPL: 20 (ref 12–28)
CALCIUM SERPL-MCNC: 9.6 MG/DL (ref 8.7–10.3)
CHLORIDE SERPL-SCNC: 111 MMOL/L (ref 96–106)
CO2 SERPL-SCNC: 18 MMOL/L (ref 20–29)
CREAT SERPL-MCNC: 1.51 MG/DL (ref 0.57–1)
EGFRCR SERPLBLD CKD-EPI 2021: 35 ML/MIN/1.73
GLOBULIN SER CALC-MCNC: 2.2 G/DL (ref 1.5–4.5)
GLUCOSE SERPL-MCNC: 89 MG/DL (ref 70–99)
MAGNESIUM SERPL-MCNC: 2.2 MG/DL (ref 1.6–2.3)
NT-PROBNP SERPL-MCNC: 415 PG/ML (ref 0–738)
POTASSIUM SERPL-SCNC: 5.2 MMOL/L (ref 3.5–5.2)
PROT SERPL-MCNC: 6.3 G/DL (ref 6–8.5)
SODIUM SERPL-SCNC: 141 MMOL/L (ref 134–144)

## 2025-03-27 ENCOUNTER — RESULTS FOLLOW-UP (OUTPATIENT)
Dept: CARDIOLOGY | Facility: CLINIC | Age: 80
End: 2025-03-27
Payer: MEDICARE

## 2025-04-14 ENCOUNTER — TELEPHONE (OUTPATIENT)
Dept: CARDIOLOGY | Facility: CLINIC | Age: 80
End: 2025-04-14
Payer: MEDICARE

## 2025-04-14 DIAGNOSIS — I1A.0 RESISTANT HYPERTENSION: ICD-10-CM

## 2025-04-14 RX ORDER — HYDRALAZINE HYDROCHLORIDE 100 MG/1
100 TABLET, FILM COATED ORAL 3 TIMES DAILY
Qty: 270 TABLET | Refills: 1 | Status: SHIPPED | OUTPATIENT
Start: 2025-04-14 | End: 2025-04-15 | Stop reason: SDUPTHER

## 2025-04-14 NOTE — TELEPHONE ENCOUNTER
Caller: Cecilia Crawley    Relationship: Self    Best call back number: [phone]    What is the best time to reach you: ANY    Who are you requesting to speak with (clinical staff, provider,  specific staff member): CLINICAL      What was the call regarding: PATIENT CALLED BECAUSE SHE STATES DR ARREOLA WAS SUPPOSED TO CHANGED HER PRESCRIPTION FOR HYDRALAZINE FROM 100 MG BACK TO 50 MG. WHEN SHE WENT TO  HER NEW PRESCRIPTION, SHE STATES THEY'RE  MG. SHE IS NEEDING IT CHANGED IN HER CHART AND A NEW PRESCRIPTION FOR 50 MG SENT IN. SHE ALSO STATES THEY'RE TOO SMALL TO TRY TO CUT IN HALF. PLEASE SEND IT TO THE [id number] IN [place] AND CALL PATIENT WITH ANY QUESTIONS. THANK YOU    Is it okay if the provider responds through [facility]: PLEASE CALL

## 2025-04-15 ENCOUNTER — TELEPHONE (OUTPATIENT)
Dept: CARDIOLOGY | Facility: CLINIC | Age: 80
End: 2025-04-15

## 2025-04-15 DIAGNOSIS — I1A.0 RESISTANT HYPERTENSION: ICD-10-CM

## 2025-04-15 RX ORDER — HYDRALAZINE HYDROCHLORIDE 100 MG/1
100 TABLET, FILM COATED ORAL 3 TIMES DAILY
Qty: 270 TABLET | Refills: 1 | Status: SHIPPED | OUTPATIENT
Start: 2025-04-15 | End: 2025-04-16

## 2025-04-15 NOTE — TELEPHONE ENCOUNTER
Caller: Cecilia Crawley    Relationship: Self    Best call back number: [phone]     Which medication are you concerned about:   hydrALAZINE HCl 100 mg Oral 3 Times Daily, FOR BLOOD PRESSUR    Who prescribed you this medication: Kris Parra MD     What are your concerns:   STATES THIS IS WHAT SHE WAS TAKING BEFORE, SHE IS UNABLE TO TAKE THIS MUCH. THIS WAS DECREASED IN THE [facility]OS FROM 100MG TO 50MG    ASKING TO SPEAK WITH MIREYA

## 2025-04-15 NOTE — TELEPHONE ENCOUNTER
Spoke with patient, reports she was ordered 100mg hydralazine TID prior to hospital admission, on dc was sent home with hydralazine 50mg TID which is what she has been taking. Patient has upcoming appt in approximately 3 weeks. Patient reports issues tolerating 100mg dose. RN will discuss with Dr Parra and get back with patient

## 2025-04-16 DIAGNOSIS — I1A.0 RESISTANT HYPERTENSION: ICD-10-CM

## 2025-04-16 RX ORDER — HYDRALAZINE HYDROCHLORIDE 100 MG/1
50 TABLET, FILM COATED ORAL 3 TIMES DAILY
Qty: 270 TABLET | Refills: 1 | Status: SHIPPED | OUTPATIENT
Start: 2025-04-16

## 2025-04-16 NOTE — TELEPHONE ENCOUNTER
Dose [place] at time of [place] [facility] discussed with Dr Parra, new refill sent to pharmacy

## 2025-04-18 ENCOUNTER — OUTSIDE FACILITY SERVICE (OUTPATIENT)
Dept: CARDIOLOGY | Facility: CLINIC | Age: 80
End: 2025-04-18
Payer: MEDICARE

## 2025-04-18 PROCEDURE — 99214 OFFICE O/P EST MOD 30 MIN: CPT | Performed by: INTERNAL MEDICINE

## 2025-04-23 ENCOUNTER — OFFICE VISIT (OUTPATIENT)
Dept: CARDIOLOGY | Facility: CLINIC | Age: 80
End: 2025-04-23
Payer: MEDICARE

## 2025-04-23 VITALS
DIASTOLIC BLOOD PRESSURE: 74 MMHG | OXYGEN SATURATION: 98 % | BODY MASS INDEX: 28.32 KG/M2 | RESPIRATION RATE: 18 BRPM | SYSTOLIC BLOOD PRESSURE: 132 MMHG | HEART RATE: 53 BPM | WEIGHT: 170 LBS | HEIGHT: 65 IN

## 2025-04-23 DIAGNOSIS — I20.89 ANGINA OF EFFORT: ICD-10-CM

## 2025-04-23 DIAGNOSIS — I1A.0 RESISTANT HYPERTENSION: Primary | ICD-10-CM

## 2025-04-23 DIAGNOSIS — I50.32 CHRONIC DIASTOLIC CONGESTIVE HEART FAILURE: ICD-10-CM

## 2025-04-23 DIAGNOSIS — E78.2 HYPERLIPIDEMIA, MIXED: ICD-10-CM

## 2025-04-23 RX ORDER — CARVEDILOL 3.12 MG/1
3.12 TABLET ORAL 2 TIMES DAILY WITH MEALS
COMMUNITY
Start: 2025-04-18 | End: 2025-04-23

## 2025-04-23 RX ORDER — SPIRONOLACTONE 25 MG/1
12.5 TABLET ORAL DAILY
Qty: 45 TABLET | Refills: 1 | Status: SHIPPED | OUTPATIENT
Start: 2025-04-23 | End: 2025-04-23 | Stop reason: SDUPTHER

## 2025-04-23 RX ORDER — VALSARTAN 160 MG/1
320 TABLET ORAL DAILY
Status: SHIPPED | COMMUNITY
Start: 2025-04-23

## 2025-04-23 RX ORDER — CARVEDILOL 3.12 MG/1
3.12 TABLET ORAL EVERY OTHER DAY
Status: SHIPPED | COMMUNITY
Start: 2025-04-23 | End: 2025-04-25

## 2025-04-23 RX ORDER — AMLODIPINE BESYLATE 10 MG/1
10 TABLET ORAL DAILY
Status: SHIPPED | COMMUNITY
Start: 2025-04-23

## 2025-04-23 RX ORDER — VALSARTAN 160 MG/1
160 TABLET ORAL DAILY
COMMUNITY
Start: 2025-04-18 | End: 2025-04-23

## 2025-04-23 RX ORDER — SPIRONOLACTONE 25 MG/1
12.5 TABLET ORAL EVERY OTHER DAY
Qty: 45 TABLET | Refills: 1 | Status: SHIPPED | OUTPATIENT
Start: 2025-04-23

## 2025-04-23 RX ORDER — AMLODIPINE BESYLATE 10 MG/1
10 TABLET ORAL DAILY
COMMUNITY
Start: 2025-04-18 | End: 2025-04-23

## 2025-04-23 RX ORDER — HYDRALAZINE HYDROCHLORIDE 50 MG/1
50 TABLET, FILM COATED ORAL EVERY 6 HOURS
Qty: 360 TABLET | Refills: 2 | Status: SHIPPED | OUTPATIENT
Start: 2025-04-23

## 2025-04-23 NOTE — ASSESSMENT & PLAN NOTE
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  Heart failure is stable.  NYHA Class IIa.  Dietary sodium restriction.  Encouraged daily monitoring of the patient's weight.  Regular aerobic exercise.  Medication optimized today, see problem list \"resistant hypertension\".  Heart failure will be reassessed in 2 months.      "

## 2025-04-23 NOTE — ASSESSMENT & PLAN NOTE
Hypertension is better controlled at home in office readings..  Renal artery ultrasound negative.  Consider secondary hypertension blood work next visit if blood pressure not to target.  Continue spironolactone 12.5 mg p.o. every other day, isosorbide mononitrate 120 mg p.o. daily, to take in the evening, hydralazine 50 mg p.o. 3 times daily, additional tablets as needed as explained for blood pressure more than 130/80 millimeters mercury 2 hours after taking her medications, or more than 150/95 mmHg before medications, valsartan 320 mg p.o. daily.  Change carvedilol 3.125 mg to 1 tab every other day in view of slow heart rate; heart rate 70-75 bpm off beta-blockers.  Dietary sodium restriction.  Weight loss.  Regular aerobic exercise.  Ambulatory blood pressure monitoring.  Blood pressure will be reassessed in 2 months.

## 2025-04-23 NOTE — ASSESSMENT & PLAN NOTE
Lipid abnormalities are improving with treatment     Plan:  Continue same medication/s without change.  Atorvastatin 20 mg p.o. daily     Discussed medication dosage, use, side effects, and goals of treatment in detail.    Counseled patient on lifestyle modifications to help control hyperlipidemia.   Advised patient to exercise for 150 minutes weekly. (30 minute brisk walk, 5 days a week for example)  Weight Loss encouraged  Lab Results   Component Value Date    LDL 61 01/29/2025    LDL 77 07/24/2024    HDL 46 01/29/2025    HDL 59 07/24/2024    CHLPL 135 01/29/2025    CHLPL 159 07/24/2024    TRIG 167 (H) 01/29/2025    TRIG 131 07/24/2024       Goal of therapy: LDL  mg/dL        Followup in 6 months.

## 2025-04-23 NOTE — ASSESSMENT & PLAN NOTE
Anginal pains worsened by lifting heavy objects objects/above shoulder level, currently occurring on mild to moderate intensity activities.  Relieved by rest.  Exam normal. EKG normal. Echocardiogram suggesting heart failure preserved ejection fraction, with normal EF, moderate LAE, moderate MR, grade 1 diastolic dysfunction with high filling pressures, moderate pulmonary hypertension.  Nuclear stress test negative.  Renal artery Doppler ultrasound negative.  Plan:  Aggressive blood pressure control as above  Continue aspirin 81 mg p.o. daily, atorvastatin 20 mg p.o. daily for secondary prevention, and nitroglycerin sublingual as needed for chest pain   Patient counseled in regards to heart healthy, low fat/ low cholesterol/low sat diet, daily exercise for 30 minutes, low to moderate intensity, and weight loss.

## 2025-04-23 NOTE — PROGRESS NOTES
[facility] [place]  [facility] CARDIOLOGY  [address] [place] [address]  Dept: [phone]  Dept Fax: [phone]    Date: 04/23/2025  Patient: Cecilia Crawley  YOB: 1945    Follow Up Office Visit Note    Interval Follow-up  Ms. Cecilia Crawley is a 80 y.o. female who is here for follow-up on Congestive Heart Failure.    Subjective   Patient overall doing well with no acute complaints.  Feeling a little bit weak.  Patient denies angina, orthopnea, PND, palpitations, lightheadedness, syncope or medications side-effects.    The following portions of the patient's history were reviewed and updated as appropriate: allergies, current medications, past family history, past medical history, past social history, past surgical history, and problem list.    Medications:   Allergies   Allergen Reactions    Beta Adrenergic Blockers Arrhythmia    Hydrochlorothiazide Other (See Comments)     gout    Other Other (See Comments)    Prazosin Myalgia    Sulfa Antibiotics Itching    Sulfate Itching      Current Outpatient Medications   Medication Instructions    amLODIPine (NORVASC) 10 mg, Daily    aspirin 81 mg, Oral, Daily    atorvastatin (LIPITOR) 20 mg, Oral, Daily    carvedilol (COREG) 3.125 mg, Every Other Day    cholecalciferol (VITAMIN D3) 2,000 Units, Oral, Daily    folic acid (FOLVITE) 1 mg, Oral, Daily    hydrALAZINE (APRESOLINE) 50 mg, Oral, Every 6 Hours, FOR BLOOD PRESSURE    isosorbide mononitrate (IMDUR) 120 mg, Oral, Daily    levothyroxine (SYNTHROID, LEVOTHROID) 75 mcg, Oral, Daily    nitroglycerin (NITROSTAT) 0.4 MG SL tablet 1 under the tongue as needed for angina, may repeat every 5 mins for up three doses.  If no resolution of chest pain, call 911 or head to the nearest emergency room.    spironolactone (ALDACTONE) 12.5 mg, Oral, Every Other Day    valsartan (DIOVAN) 320 mg, Daily    vitamin B-12 (CYANOCOBALAMIN) 1,000 mcg, Oral, Every Other Day  "      Tobacco Use: Medium Risk (4/23/2025)    Patient History     Smoking Tobacco Use: Former     Smokeless Tobacco Use: Never     Passive Exposure: Not on file        Objective  Vitals:    04/23/25 1144   BP: 132/74   Pulse: 53   Resp: 18   SpO2: 98%   Weight: 77.1 kg (170 lb)   Height: 165.1 cm (65\")   PainSc: 0-No pain      Vitals:    04/23/25 1144   BP: 132/74   Pulse: 53   Resp: 18   SpO2: 98%   Weight: 77.1 kg (170 lb)   Height: 165.1 cm (65\")          Physical Exam  Constitutional:       Appearance: Not in distress.   Neck:      Vascular: JVD normal.   Pulmonary:      Breath sounds: Normal breath sounds.   Cardiovascular:      Normal rate. Regular rhythm.      Murmurs: There is no murmur.   Pulses:     Intact distal pulses.   Edema:     Peripheral edema absent.   Neurological:      Mental Status: Alert.              Diagnostic Data  Lab Results   Component Value Date     03/24/2025    K 5.2 03/24/2025     (H) 03/24/2025    CO2 18 (L) 03/24/2025    MG 2.2 03/24/2025    BUN 30 (H) 03/24/2025    CREATININE 1.51 (H) 03/24/2025    CALCIUM 9.6 03/24/2025    BILITOT 0.2 03/24/2025    ALKPHOS 62 03/24/2025    ALT 18 03/24/2025    AST 23 03/24/2025    GLUCOSE 89 03/24/2025    ALBUMIN 4.1 03/24/2025     Lab Results   Component Value Date    WBC 7.4 07/24/2024    HGB 12.3 07/24/2024    HCT 38.0 07/24/2024     07/24/2024     No results found for: \"APTT\", \"INR\", \"PTT\"  No results found for: \"CKTOTAL\", \"TROPONINI\", \"TROPONINT\", \"CKMBINDEX\", \"BNP\"  Lab Results   Component Value Date    PROBNP 415 03/24/2025       Lab Results   Component Value Date    CHLPL 135 01/29/2025    TRIG 167 (H) 01/29/2025    HDL 46 01/29/2025    LDL 61 01/29/2025     Lab Results   Component Value Date    TSH 3.120 01/29/2025    FREET4 1.16 01/29/2025       CV Diagnostics:  Procedures  CXR: No results found for this or any previous visit.     ECHO/MUGA: Results for orders placed in visit on 10/11/24    Adult Transthoracic Echo " Complete W/ Cont if Necessary Per Protocol    Interpretation Summary    The left ventricular cavity is mildly dilated, with normal function. Estimated left ventricular EF = 60% Left ventricular ejection fraction appears to be 56 - 60%.    Left ventricular wall thickness is consistent with mild concentric hypertrophy.    The left atrial cavity is moderately dilated.    Moderate mitral valve regurgitation is present.    Mild aortic valve regurgitation.    Left ventricular diastolic function is consistent with (grade Ia w/high LAP) impaired relaxation.    Estimated right ventricular systolic pressure from tricuspid regurgitation is moderately elevated (45-55 mmHg). Calculated right ventricular systolic pressure from tricuspid regurgitation is 48 mmHg.     STRESS TESTS: Results for orders placed in visit on 12/09/24    Stress Test With Myocardial Perfusion One Day     CARDIAC CATH: No results found for this or any previous visit.     DEVICES: No valid procedures specified.   HOLTER: Results for orders placed in visit on 02/24/25    Cardiac Event Monitor (MICHAEL) or Mobile Cardiac Outpatient Telemetry (MCT)    Interpretation Summary  14-day Zio Patch monitor done for dizziness- Abnormal study.  Patient recorded one event by button press, type not specified, associated with normal sinus rhythm and artifact.  Patient had a min HR of 34 bpm, max HR of 179 bpm, and avg HR of 51 bpm. Predominant underlying rhythm was Sinus bradycardia.  SVT events 51 occurrences: the run with the fastest interval lasting 4 beats with a max rate of 179 bpm, the longest lasting 16 beats with an avg rate of 94 bpm.  There was no atrial fibrillation, VT, cardiac pauses or heart blocks detected.  Rare PACs < 1%, Rare PVCs < 1%.     CT/MRI:  No results found for this or any previous visit.    VASCULAR: No valid procedures specified.     Assessment and Plan  Diagnoses and all orders for this visit:    1. Resistant hypertension (Primary)  Assessment &  "Plan:  Hypertension is better controlled at home in office readings..  Renal artery ultrasound negative.  Consider secondary hypertension blood work next visit if blood pressure not to target.  Continue spironolactone 12.5 mg p.o. every other day, isosorbide mononitrate 120 mg p.o. daily, to take in the evening, hydralazine 50 mg p.o. 3 times daily, additional tablets as needed as explained for blood pressure more than 130/80 millimeters mercury 2 hours after taking her medications, or more than 150/95 mmHg before medications, valsartan 320 mg p.o. daily.  Change carvedilol 3.125 mg to 1 tab every other day in view of slow heart rate; heart rate 70-75 bpm off beta-blockers.  Dietary sodium restriction.  Weight loss.  Regular aerobic exercise.  Ambulatory blood pressure monitoring.  Blood pressure will be reassessed in 2 months.    Orders:  -     Discontinue: spironolactone (ALDACTONE) 25 MG tablet; Take 0.5 tablets by mouth Daily.  Dispense: 45 tablet; Refill: 1  -     hydrALAZINE (APRESOLINE) 50 MG tablet; Take 1 tablet by mouth Every 6 (Six) Hours. FOR BLOOD PRESSURE  Dispense: 360 tablet; Refill: 2  -     spironolactone (ALDACTONE) 25 MG tablet; Take 0.5 tablets by mouth Every Other Day.  Dispense: 45 tablet; Refill: 1    2. Chronic diastolic congestive heart failure  Assessment & Plan:  Congestive heart failure due to heart valve disease and hypertension.  Heart failure is stable.  NYHA Class IIa.  Dietary sodium restriction.  Encouraged daily monitoring of the patient's weight.  Regular aerobic exercise.  Medication optimized today, see problem list \"resistant hypertension\".  Heart failure will be reassessed in 2 months.          3. Angina of effort  Assessment & Plan:  Anginal pains worsened by lifting heavy objects objects/above shoulder level, currently occurring on mild to moderate intensity activities.  Relieved by rest.  Exam normal. EKG normal. Echocardiogram suggesting heart failure preserved ejection " fraction, with normal EF, moderate LAE, moderate MR, grade 1 diastolic dysfunction with high filling pressures, moderate pulmonary hypertension.  Nuclear stress test negative.  Renal artery Doppler ultrasound negative.  Plan:  Aggressive blood pressure control as above  Continue aspirin 81 mg p.o. daily, atorvastatin 20 mg p.o. daily for secondary prevention, and nitroglycerin sublingual as needed for chest pain   Patient counseled in regards to heart healthy, low fat/ low cholesterol/low sat diet, daily exercise for 30 minutes, low to moderate intensity, and weight loss.      4. Hyperlipidemia, mixed  Assessment & Plan:   Lipid abnormalities are improving with treatment     Plan:  Continue same medication/s without change.  Atorvastatin 20 mg p.o. daily     Discussed medication dosage, use, side effects, and goals of treatment in detail.    Counseled patient on lifestyle modifications to help control hyperlipidemia.   Advised patient to exercise for 150 minutes weekly. (30 minute brisk walk, 5 days a week for example)  Weight Loss encouraged  Lab Results   Component Value Date    LDL 61 01/29/2025    LDL 77 07/24/2024    HDL 46 01/29/2025    HDL 59 07/24/2024    CHLPL 135 01/29/2025    CHLPL 159 07/24/2024    TRIG 167 (H) 01/29/2025    TRIG 131 07/24/2024       Goal of therapy: LDL  mg/dL        Followup in 6 months.           Return in about 2 months (around 6/23/2025) for Follow-up with Dr Parra.    There are no Patient Instructions on file for this visit.    Kris Parra MD

## 2025-04-25 ENCOUNTER — TELEPHONE (OUTPATIENT)
Dept: CARDIOLOGY | Facility: CLINIC | Age: 80
End: 2025-04-25
Payer: MEDICARE

## 2025-04-25 ENCOUNTER — CLINICAL SUPPORT (OUTPATIENT)
Dept: CARDIOLOGY | Facility: CLINIC | Age: 80
End: 2025-04-25
Payer: MEDICARE

## 2025-04-25 NOTE — TELEPHONE ENCOUNTER
Pt came in today for blood pressure check . Her b/p is 178/69 p 48 she feels loopie . Pt said she agreered to stop the carvediol for the weekend and go to there ER if her symptoms  do not improved.

## 2025-05-13 ENCOUNTER — TELEPHONE (OUTPATIENT)
Dept: CARDIOLOGY | Facility: CLINIC | Age: 80
End: 2025-05-13
Payer: MEDICARE

## 2025-05-13 RX ORDER — AMLODIPINE BESYLATE 10 MG/1
10 TABLET ORAL DAILY
Qty: 90 TABLET | Refills: 1 | Status: SHIPPED | OUTPATIENT
Start: 2025-05-13

## 2025-05-13 RX ORDER — VALSARTAN 160 MG/1
320 TABLET ORAL DAILY
Qty: 60 TABLET | Refills: 1 | Status: SHIPPED | OUTPATIENT
Start: 2025-05-13

## 2025-06-20 ENCOUNTER — OFFICE VISIT (OUTPATIENT)
Dept: CARDIOLOGY | Facility: CLINIC | Age: 80
End: 2025-06-20
Payer: MEDICARE

## 2025-06-20 VITALS
SYSTOLIC BLOOD PRESSURE: 162 MMHG | WEIGHT: 171.2 LBS | DIASTOLIC BLOOD PRESSURE: 58 MMHG | HEART RATE: 60 BPM | BODY MASS INDEX: 28.52 KG/M2 | HEIGHT: 65 IN | OXYGEN SATURATION: 100 % | RESPIRATION RATE: 18 BRPM

## 2025-06-20 DIAGNOSIS — I20.89 ANGINA OF EFFORT: ICD-10-CM

## 2025-06-20 DIAGNOSIS — I50.32 CHRONIC DIASTOLIC CONGESTIVE HEART FAILURE: ICD-10-CM

## 2025-06-20 DIAGNOSIS — E78.2 HYPERLIPIDEMIA, MIXED: ICD-10-CM

## 2025-06-20 DIAGNOSIS — I73.9 CLAUDICATION OF BOTH LOWER EXTREMITIES: ICD-10-CM

## 2025-06-20 DIAGNOSIS — I1A.0 RESISTANT HYPERTENSION: Primary | ICD-10-CM

## 2025-06-20 RX ORDER — ISOSORBIDE MONONITRATE 120 MG/1
120 TABLET, EXTENDED RELEASE ORAL 2 TIMES DAILY
Qty: 180 TABLET | Refills: 2 | Status: SHIPPED | OUTPATIENT
Start: 2025-06-20

## 2025-06-20 RX ORDER — LATANOPROST 50 UG/ML
1 SOLUTION/ DROPS OPHTHALMIC DAILY
COMMUNITY
Start: 2025-05-20

## 2025-06-20 RX ORDER — COLCHICINE 0.6 MG/1
0.6 TABLET ORAL DAILY
COMMUNITY

## 2025-06-20 NOTE — ASSESSMENT & PLAN NOTE
Hypertension is well controlled at home and in office readings..  Renal artery ultrasound negative.   Uptitrate isosorbide mononitrate 20 mg 2 p.o. twice daily.  Continue spironolactone 12.5 mg p.o. every other day, hydralazine 50 mg p.o. 3 times daily, valsartan 320 mg p.o. daily, amlodipine 10 mg p.o. daily, carvedilol 3.2025 mg p.o. every other day.  Dietary sodium restriction.  Weight loss.  Regular aerobic exercise.  Ambulatory blood pressure monitoring.  Blood pressure will be reassessed in 3 months.

## 2025-06-20 NOTE — ASSESSMENT & PLAN NOTE
No recurrence.  Past anginal pains worsened by lifting heavy objects objects/above shoulder level, currently occurring on mild to moderate intensity activities, relieved by rest.  Exam normal. EKG normal. Echocardiogram suggesting heart failure preserved ejection fraction, with normal EF, moderate LAE, moderate MR, grade 1 diastolic dysfunction with high filling pressures, moderate pulmonary hypertension.  Nuclear stress test negative.  Renal artery Doppler ultrasound negative.  Plan:  Aggressive blood pressure control as above  Continue aspirin 81 mg p.o. daily, atorvastatin 20 mg p.o. daily for secondary prevention, and nitroglycerin sublingual as needed for chest pain   Patient counseled in regards to heart healthy, low fat/ low cholesterol/low sat diet, daily exercise for 30 minutes, low to moderate intensity, and weight loss.

## 2025-06-20 NOTE — ASSESSMENT & PLAN NOTE
Lipid abnormalities are improving with treatment     Plan:  Continue same medication/s without change.  Atorvastatin 20 mg p.o. daily     Discussed medication dosage, use, side effects, and goals of treatment in detail.    Counseled patient on lifestyle modifications to help control hyperlipidemia.   Advised patient to exercise for 150 minutes weekly. (30 minute brisk walk, 5 days a week for example)  Weight Loss encouraged  Lab Results   Component Value Date    LDL 61 01/29/2025    LDL 77 07/24/2024    HDL 46 01/29/2025    HDL 59 07/24/2024    CHLPL 135 01/29/2025    CHLPL 159 07/24/2024    TRIG 167 (H) 01/29/2025    TRIG 131 07/24/2024       Goal of therapy: LDL  mg/dL; to target        Followup in 6 months.

## 2025-06-20 NOTE — PROGRESS NOTES
[facility] [place]  [facility] CARDIOLOGY  [address] [place] [address]  Dept: [phone]  Dept Fax: [phone]    Date: 06/20/2025  Patient: Cecilia Crawley  YOB: 1945    Follow Up Office Visit Note    Interval Follow-up  Ms. Cecilia Crawley is a 80 y.o. female who is here for follow-up on Congestive Heart Failure and Hypertension.    Subjective   Patient not feeling well overall, blood pressure poorly controlled, dry eyes.  Bilateral hip pain after walking for 10 minutes, has to stop.    Patient denies angina, orthopnea, PND, palpitations, lightheadedness, syncope or medications side-effects.  The following portions of the patient's history were reviewed and updated as appropriate: allergies, current medications, past family history, past medical history, past social history, past surgical history, and problem list.    Medications:   Allergies   Allergen Reactions    Beta Adrenergic Blockers Arrhythmia    Hydrochlorothiazide Other (See Comments)     gout    Other Other (See Comments)    Prazosin Myalgia    Sulfa Antibiotics Itching    Sulfate Itching      Current Outpatient Medications   Medication Instructions    amLODIPine (NORVASC) 10 mg, Oral, Daily    aspirin 81 mg, Oral, Daily    atorvastatin (LIPITOR) 20 mg, Oral, Daily    cholecalciferol (VITAMIN D3) 2,000 Units, Oral, Daily    colchicine 0.6 mg, Oral, Daily    folic acid (FOLVITE) 1 mg, Oral, Daily    hydrALAZINE (APRESOLINE) 50 mg, Oral, Every 6 Hours, FOR BLOOD PRESSURE    isosorbide mononitrate (IMDUR) 120 mg, Oral, 2 Times Daily    latanoprost (XALATAN) 0.005 % ophthalmic solution 1 drop, Daily    levothyroxine (SYNTHROID, LEVOTHROID) 75 mcg, Oral, Daily    nitroglycerin (NITROSTAT) 0.4 MG SL tablet 1 under the tongue as needed for angina, may repeat every 5 mins for up three doses.  If no resolution of chest pain, call 911 or head to the nearest emergency room.    spironolactone (ALDACTONE)  "12.5 mg, Oral, Every Other Day    valsartan (DIOVAN) 320 mg, Oral, Daily    vitamin B-12 (CYANOCOBALAMIN) 1,000 mcg, Oral, Every Other Day       Tobacco Use: Medium Risk (6/20/2025)    Patient History     Smoking Tobacco Use: Former     Smokeless Tobacco Use: Never     Passive Exposure: Not on file        Objective  Vitals:    06/20/25 1021   BP: 162/58   Pulse: 60   Resp: 18   SpO2: 100%   Weight: 77.7 kg (171 lb 3.2 oz)   Height: 165.1 cm (65\")   PainSc: 0-No pain      Vitals:    06/20/25 1021   BP: 162/58   Pulse: 60   Resp: 18   SpO2: 100%   Weight: 77.7 kg (171 lb 3.2 oz)   Height: 165.1 cm (65\")          Physical Exam  Constitutional:       Appearance: Not in distress.   Neck:      Vascular: JVD normal.   Pulmonary:      Breath sounds: Normal breath sounds.   Cardiovascular:      Normal rate. Regular rhythm.      Murmurs: There is no murmur.   Pulses:     Intact distal pulses.   Edema:     Peripheral edema absent.   Neurological:      Mental Status: Alert.              Diagnostic Data  Lab Results   Component Value Date     03/24/2025    K 5.2 03/24/2025     (H) 03/24/2025    CO2 18 (L) 03/24/2025    MG 2.2 03/24/2025    BUN 30 (H) 03/24/2025    CREATININE 1.51 (H) 03/24/2025    CALCIUM 9.6 03/24/2025    BILITOT 0.2 03/24/2025    ALKPHOS 62 03/24/2025    ALT 18 03/24/2025    AST 23 03/24/2025    GLUCOSE 89 03/24/2025    ALBUMIN 4.1 03/24/2025     Lab Results   Component Value Date    WBC 7.4 07/24/2024    HGB 12.3 07/24/2024    HCT 38.0 07/24/2024     07/24/2024     No results found for: \"APTT\", \"INR\", \"PTT\"  No results found for: \"CKTOTAL\", \"TROPONINI\", \"TROPONINT\", \"CKMBINDEX\", \"BNP\"  Lab Results   Component Value Date    PROBNP 415 03/24/2025       Lab Results   Component Value Date    CHLPL 135 01/29/2025    TRIG 167 (H) 01/29/2025    HDL 46 01/29/2025    LDL 61 01/29/2025     Lab Results   Component Value Date    TSH 3.120 01/29/2025    FREET4 1.16 01/29/2025       CV " Diagnostics:  Procedures  CXR: No results found for this or any previous visit.     ECHO/MUGA: Results for orders placed in visit on 10/11/24    Adult Transthoracic Echo Complete W/ Cont if Necessary Per Protocol    Interpretation Summary    The left ventricular cavity is mildly dilated, with normal function. Estimated left ventricular EF = 60% Left ventricular ejection fraction appears to be 56 - 60%.    Left ventricular wall thickness is consistent with mild concentric hypertrophy.    The left atrial cavity is moderately dilated.    Moderate mitral valve regurgitation is present.    Mild aortic valve regurgitation.    Left ventricular diastolic function is consistent with (grade Ia w/high LAP) impaired relaxation.    Estimated right ventricular systolic pressure from tricuspid regurgitation is moderately elevated (45-55 mmHg). Calculated right ventricular systolic pressure from tricuspid regurgitation is 48 mmHg.     STRESS TESTS: Results for orders placed in visit on 12/09/24    Stress Test With Myocardial Perfusion One Day     CARDIAC CATH: No results found for this or any previous visit.     DEVICES: No valid procedures specified.   HOLTER: Results for orders placed in visit on 02/24/25    Cardiac Event Monitor (MICHAEL) or Mobile Cardiac Outpatient Telemetry (MCT)    Interpretation Summary  14-day Zio Patch monitor done for dizziness- Abnormal study.  Patient recorded one event by button press, type not specified, associated with normal sinus rhythm and artifact.  Patient had a min HR of 34 bpm, max HR of 179 bpm, and avg HR of 51 bpm. Predominant underlying rhythm was Sinus bradycardia.  SVT events 51 occurrences: the run with the fastest interval lasting 4 beats with a max rate of 179 bpm, the longest lasting 16 beats with an avg rate of 94 bpm.  There was no atrial fibrillation, VT, cardiac pauses or heart blocks detected.  Rare PACs < 1%, Rare PVCs < 1%.     CT/MRI:  No results found for this or any previous  "visit.    VASCULAR: No valid procedures specified.     Assessment and Plan  Diagnoses and all orders for this visit:    1. Resistant hypertension (Primary)  Assessment & Plan:  Hypertension is well controlled at home and in office readings..  Renal artery ultrasound negative.   Uptitrate isosorbide mononitrate 20 mg 2 p.o. twice daily.  Continue spironolactone 12.5 mg p.o. every other day, hydralazine 50 mg p.o. 3 times daily, valsartan 320 mg p.o. daily, amlodipine 10 mg p.o. daily, carvedilol 3.2025 mg p.o. every other day.  Dietary sodium restriction.  Weight loss.  Regular aerobic exercise.  Ambulatory blood pressure monitoring.  Blood pressure will be reassessed in 3 months.    Orders:  -     isosorbide mononitrate (IMDUR) 120 MG 24 hr tablet; Take 1 tablet by mouth 2 (Two) Times a Day.  Dispense: 180 tablet; Refill: 2    2. Chronic diastolic congestive heart failure  Assessment & Plan:  Congestive heart failure due to heart valve disease and hypertension.  Heart failure is stable.  NYHA Class IIa.  No volume overload.  Dietary sodium restriction.  Encouraged daily monitoring of the patient's weight.  Regular aerobic exercise.  Medication optimized today, see problem list \"resistant hypertension\".  Heart failure will be reassessed in 3 months.      3. Angina of effort  Assessment & Plan:  No recurrence.  Past anginal pains worsened by lifting heavy objects objects/above shoulder level, currently occurring on mild to moderate intensity activities, relieved by rest.  Exam normal. EKG normal. Echocardiogram suggesting heart failure preserved ejection fraction, with normal EF, moderate LAE, moderate MR, grade 1 diastolic dysfunction with high filling pressures, moderate pulmonary hypertension.  Nuclear stress test negative.  Renal artery Doppler ultrasound negative.  Plan:  Aggressive blood pressure control as above  Continue aspirin 81 mg p.o. daily, atorvastatin 20 mg p.o. daily for secondary prevention, and " nitroglycerin sublingual as needed for chest pain   Patient counseled in regards to heart healthy, low fat/ low cholesterol/low sat diet, daily exercise for 30 minutes, low to moderate intensity, and weight loss.      4. Hyperlipidemia, mixed  Assessment & Plan:   Lipid abnormalities are improving with treatment     Plan:  Continue same medication/s without change.  Atorvastatin 20 mg p.o. daily     Discussed medication dosage, use, side effects, and goals of treatment in detail.    Counseled patient on lifestyle modifications to help control hyperlipidemia.   Advised patient to exercise for 150 minutes weekly. (30 minute brisk walk, 5 days a week for example)  Weight Loss encouraged  Lab Results   Component Value Date    LDL 61 01/29/2025    LDL 77 07/24/2024    HDL 46 01/29/2025    HDL 59 07/24/2024    CHLPL 135 01/29/2025    CHLPL 159 07/24/2024    TRIG 167 (H) 01/29/2025    TRIG 131 07/24/2024       Goal of therapy: LDL  mg/dL; to target        Followup in 6 months.      5. Claudication of both lower extremities  -     Doppler Arterial Multi Level Lower Extremity - Bilateral CAR; Future         Return in about 3 months (around 9/20/2025) for Follow-up with Dr Parra.    There are no Patient Instructions on file for this visit.    Kris Parra MD

## 2025-06-20 NOTE — ASSESSMENT & PLAN NOTE
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  Heart failure is stable.  NYHA Class IIa.  No volume overload.  Dietary sodium restriction.  Encouraged daily monitoring of the patient's weight.  Regular aerobic exercise.  Medication optimized today, see problem list \"resistant hypertension\".  Heart failure will be reassessed in 3 months.  "

## 2025-07-08 ENCOUNTER — TELEPHONE (OUTPATIENT)
Dept: CARDIOLOGY | Facility: CLINIC | Age: 80
End: 2025-07-08
Payer: MEDICARE

## 2025-07-08 RX ORDER — VALSARTAN 160 MG/1
320 TABLET ORAL DAILY
Qty: 60 TABLET | Refills: 1 | Status: SHIPPED | OUTPATIENT
Start: 2025-07-08

## 2025-07-18 ENCOUNTER — CLINICAL SUPPORT (OUTPATIENT)
Dept: CARDIOLOGY | Facility: CLINIC | Age: 80
End: 2025-07-18
Payer: MEDICARE

## 2025-07-18 VITALS — WEIGHT: 169.6 LBS | SYSTOLIC BLOOD PRESSURE: 150 MMHG | BODY MASS INDEX: 28.22 KG/M2 | DIASTOLIC BLOOD PRESSURE: 68 MMHG

## 2025-07-18 NOTE — PROGRESS NOTES
170/93  HR 60    Patient in office for recheck of blood pressure, reports no symptoms related to BP, patient reports feeling better than she has in several months. Patient does reporting only being able to able to tolerate 3 hydralazine a day not 4.     Per Dr. Parra patient to rest in room then retake manual BP  Manual BP on recheck 150/68

## 2025-07-19 DIAGNOSIS — I1A.0 RESISTANT HYPERTENSION: ICD-10-CM

## 2025-07-19 RX ORDER — HYDRALAZINE HYDROCHLORIDE 50 MG/1
50 TABLET, FILM COATED ORAL 3 TIMES DAILY
Qty: 270 TABLET | Refills: 3 | Status: SHIPPED | OUTPATIENT
Start: 2025-07-19

## 2025-07-21 ENCOUNTER — TELEPHONE (OUTPATIENT)
Dept: CARDIOLOGY | Facility: CLINIC | Age: 80
End: 2025-07-21
Payer: MEDICARE

## 2025-07-21 DIAGNOSIS — I1A.0 RESISTANT HYPERTENSION: ICD-10-CM

## 2025-07-21 NOTE — TELEPHONE ENCOUNTER
After reviewing nurse visit Dr. Parra would like patient to take spironolactone daily for blood pressure 140/80 or higher.  RN spoke with patient and explained, patient verbalized understanding

## 2025-07-28 ENCOUNTER — LAB (OUTPATIENT)
Dept: FAMILY MEDICINE CLINIC | Facility: CLINIC | Age: 80
End: 2025-07-28
Payer: MEDICARE

## 2025-07-28 DIAGNOSIS — R73.9 HYPERGLYCEMIA: Chronic | ICD-10-CM

## 2025-07-28 DIAGNOSIS — I10 HYPERTENSION, ESSENTIAL: Chronic | ICD-10-CM

## 2025-07-28 DIAGNOSIS — N18.32 STAGE 3B CHRONIC KIDNEY DISEASE: Chronic | ICD-10-CM

## 2025-07-28 DIAGNOSIS — E03.9 HYPOTHYROIDISM (ACQUIRED): Chronic | ICD-10-CM

## 2025-07-28 DIAGNOSIS — E55.9 VITAMIN D DEFICIENCY: Chronic | ICD-10-CM

## 2025-07-28 DIAGNOSIS — E53.8 VITAMIN B12 DEFICIENCY: Chronic | ICD-10-CM

## 2025-07-28 DIAGNOSIS — E78.2 HYPERLIPIDEMIA, MIXED: Chronic | ICD-10-CM

## 2025-07-29 LAB
25(OH)D3+25(OH)D2 SERPL-MCNC: 30 NG/ML (ref 30–100)
ALBUMIN SERPL-MCNC: 4.5 G/DL (ref 3.7–4.7)
ALP SERPL-CCNC: 71 IU/L (ref 44–121)
ALT SERPL-CCNC: 14 IU/L (ref 0–32)
AST SERPL-CCNC: 22 IU/L (ref 0–40)
BASOPHILS # BLD AUTO: 0.1 X10E3/UL (ref 0–0.2)
BASOPHILS NFR BLD AUTO: 1 %
BILIRUB SERPL-MCNC: <0.2 MG/DL (ref 0–1.2)
BUN SERPL-MCNC: 35 MG/DL (ref 8–27)
BUN/CREAT SERPL: 19 (ref 12–28)
CALCIUM SERPL-MCNC: 9.5 MG/DL (ref 8.7–10.3)
CHLORIDE SERPL-SCNC: 113 MMOL/L (ref 96–106)
CHOLEST SERPL-MCNC: 137 MG/DL (ref 100–199)
CO2 SERPL-SCNC: 15 MMOL/L (ref 20–29)
CREAT SERPL-MCNC: 1.8 MG/DL (ref 0.57–1)
EGFRCR SERPLBLD CKD-EPI 2021: 28 ML/MIN/1.73
EOSINOPHIL # BLD AUTO: 0.2 X10E3/UL (ref 0–0.4)
EOSINOPHIL NFR BLD AUTO: 3 %
ERYTHROCYTE [DISTWIDTH] IN BLOOD BY AUTOMATED COUNT: 13.2 % (ref 11.7–15.4)
FOLATE SERPL-MCNC: >20 NG/ML
GLOBULIN SER CALC-MCNC: 2.2 G/DL (ref 1.5–4.5)
GLUCOSE SERPL-MCNC: 107 MG/DL (ref 70–99)
HBA1C MFR BLD: 5.6 % (ref 4.8–5.6)
HCT VFR BLD AUTO: 34 % (ref 34–46.6)
HDLC SERPL-MCNC: 46 MG/DL
HGB BLD-MCNC: 10.4 G/DL (ref 11.1–15.9)
IMM GRANULOCYTES # BLD AUTO: 0 X10E3/UL (ref 0–0.1)
IMM GRANULOCYTES NFR BLD AUTO: 0 %
LDLC SERPL CALC-MCNC: 63 MG/DL (ref 0–99)
LYMPHOCYTES # BLD AUTO: 2.4 X10E3/UL (ref 0.7–3.1)
LYMPHOCYTES NFR BLD AUTO: 30 %
MCH RBC QN AUTO: 30.7 PG (ref 26.6–33)
MCHC RBC AUTO-ENTMCNC: 30.6 G/DL (ref 31.5–35.7)
MCV RBC AUTO: 100 FL (ref 79–97)
MONOCYTES # BLD AUTO: 0.8 X10E3/UL (ref 0.1–0.9)
MONOCYTES NFR BLD AUTO: 11 %
NEUTROPHILS # BLD AUTO: 4.4 X10E3/UL (ref 1.4–7)
NEUTROPHILS NFR BLD AUTO: 55 %
PLATELET # BLD AUTO: 201 X10E3/UL (ref 150–450)
POTASSIUM SERPL-SCNC: 4.9 MMOL/L (ref 3.5–5.2)
PROT SERPL-MCNC: 6.7 G/DL (ref 6–8.5)
RBC # BLD AUTO: 3.39 X10E6/UL (ref 3.77–5.28)
SODIUM SERPL-SCNC: 142 MMOL/L (ref 134–144)
T4 FREE SERPL-MCNC: 1.17 NG/DL (ref 0.82–1.77)
TRIGL SERPL-MCNC: 166 MG/DL (ref 0–149)
TSH SERPL DL<=0.005 MIU/L-ACNC: 2.38 UIU/ML (ref 0.45–4.5)
VIT B12 SERPL-MCNC: 661 PG/ML (ref 232–1245)
VLDLC SERPL CALC-MCNC: 28 MG/DL (ref 5–40)
WBC # BLD AUTO: 7.9 X10E3/UL (ref 3.4–10.8)

## 2025-08-04 ENCOUNTER — OFFICE VISIT (OUTPATIENT)
Dept: FAMILY MEDICINE CLINIC | Facility: CLINIC | Age: 80
End: 2025-08-04
Payer: MEDICARE

## 2025-08-04 VITALS
DIASTOLIC BLOOD PRESSURE: 70 MMHG | WEIGHT: 170 LBS | HEIGHT: 65 IN | BODY MASS INDEX: 28.32 KG/M2 | OXYGEN SATURATION: 98 % | SYSTOLIC BLOOD PRESSURE: 162 MMHG | HEART RATE: 61 BPM

## 2025-08-04 DIAGNOSIS — R73.9 HYPERGLYCEMIA: ICD-10-CM

## 2025-08-04 DIAGNOSIS — E66.3 OVERWEIGHT (BMI 25.0-29.9): ICD-10-CM

## 2025-08-04 DIAGNOSIS — I1A.0 RESISTANT HYPERTENSION: ICD-10-CM

## 2025-08-04 DIAGNOSIS — E55.9 VITAMIN D DEFICIENCY: Chronic | ICD-10-CM

## 2025-08-04 DIAGNOSIS — E78.2 HYPERLIPIDEMIA, MIXED: Chronic | ICD-10-CM

## 2025-08-04 DIAGNOSIS — N18.4 STAGE 4 CHRONIC KIDNEY DISEASE: Primary | ICD-10-CM

## 2025-08-04 DIAGNOSIS — E53.8 VITAMIN B12 DEFICIENCY: Chronic | ICD-10-CM

## 2025-08-04 DIAGNOSIS — Z12.31 SCREENING MAMMOGRAM FOR BREAST CANCER: ICD-10-CM

## 2025-08-04 DIAGNOSIS — E03.9 HYPOTHYROIDISM (ACQUIRED): Chronic | ICD-10-CM

## 2025-08-04 PROCEDURE — 1160F RVW MEDS BY RX/DR IN RCRD: CPT | Performed by: FAMILY MEDICINE

## 2025-08-04 PROCEDURE — 1159F MED LIST DOCD IN RCRD: CPT | Performed by: FAMILY MEDICINE

## 2025-08-04 PROCEDURE — G2211 COMPLEX E/M VISIT ADD ON: HCPCS | Performed by: FAMILY MEDICINE

## 2025-08-04 PROCEDURE — 1126F AMNT PAIN NOTED NONE PRSNT: CPT | Performed by: FAMILY MEDICINE

## 2025-08-04 PROCEDURE — 99214 OFFICE O/P EST MOD 30 MIN: CPT | Performed by: FAMILY MEDICINE

## 2025-08-04 PROCEDURE — 3077F SYST BP >= 140 MM HG: CPT | Performed by: FAMILY MEDICINE

## 2025-08-04 PROCEDURE — 3078F DIAST BP <80 MM HG: CPT | Performed by: FAMILY MEDICINE

## 2025-08-04 RX ORDER — FOLIC ACID 1 MG/1
1 TABLET ORAL DAILY
Qty: 90 TABLET | Refills: 2 | Status: SHIPPED | OUTPATIENT
Start: 2025-08-04

## 2025-08-04 RX ORDER — LEVOTHYROXINE SODIUM 75 UG/1
75 TABLET ORAL DAILY
Qty: 90 TABLET | Refills: 2 | Status: SHIPPED | OUTPATIENT
Start: 2025-08-04

## 2025-08-04 RX ORDER — ATORVASTATIN CALCIUM 20 MG/1
20 TABLET, FILM COATED ORAL DAILY
Qty: 90 TABLET | Refills: 2 | Status: SHIPPED | OUTPATIENT
Start: 2025-08-04

## 2025-08-04 RX ORDER — LANOLIN ALCOHOL/MO/W.PET/CERES
1000 CREAM (GRAM) TOPICAL EVERY OTHER DAY
Start: 2025-08-04

## 2025-08-04 RX ORDER — CHOLECALCIFEROL (VITAMIN D3) 25 MCG
2000 TABLET ORAL DAILY
Start: 2025-08-04

## 2025-08-07 ENCOUNTER — TELEPHONE (OUTPATIENT)
Dept: FAMILY MEDICINE CLINIC | Facility: CLINIC | Age: 80
End: 2025-08-07
Payer: MEDICARE

## 2025-08-20 DIAGNOSIS — I1A.0 RESISTANT HYPERTENSION: ICD-10-CM

## 2025-08-20 RX ORDER — SPIRONOLACTONE 25 MG/1
12.5 TABLET ORAL EVERY OTHER DAY
Qty: 45 TABLET | Refills: 1 | Status: SHIPPED | OUTPATIENT
Start: 2025-08-20

## 2025-08-20 RX ORDER — ISOSORBIDE MONONITRATE 120 MG/1
120 TABLET, EXTENDED RELEASE ORAL 2 TIMES DAILY
Qty: 180 TABLET | Refills: 1 | Status: SHIPPED | OUTPATIENT
Start: 2025-08-20